# Patient Record
Sex: FEMALE | HISPANIC OR LATINO | Employment: FULL TIME | URBAN - METROPOLITAN AREA
[De-identification: names, ages, dates, MRNs, and addresses within clinical notes are randomized per-mention and may not be internally consistent; named-entity substitution may affect disease eponyms.]

---

## 2017-02-09 ENCOUNTER — APPOINTMENT (OUTPATIENT)
Dept: MEDICAL GROUP | Facility: MEDICAL CENTER | Age: 22
End: 2017-02-09
Payer: COMMERCIAL

## 2017-02-16 ENCOUNTER — PATIENT MESSAGE (OUTPATIENT)
Dept: MEDICAL GROUP | Facility: MEDICAL CENTER | Age: 22
End: 2017-02-16

## 2017-02-16 DIAGNOSIS — Z30.41 FAMILY PLANNING, BCP (BIRTH CONTROL PILLS) MAINTENANCE: ICD-10-CM

## 2017-02-17 ENCOUNTER — PATIENT MESSAGE (OUTPATIENT)
Dept: MEDICAL GROUP | Facility: MEDICAL CENTER | Age: 22
End: 2017-02-17

## 2017-02-17 RX ORDER — NORETHINDRONE ACETATE AND ETHINYL ESTRADIOL .02; 1 MG/1; MG/1
1 TABLET ORAL DAILY
Qty: 28 TAB | Refills: 5 | Status: SHIPPED | OUTPATIENT
Start: 2017-02-17 | End: 2019-01-30

## 2017-02-17 NOTE — TELEPHONE ENCOUNTER
From: Chase Goode  To: KADE Wu  Sent: 2/17/2017 9:26 AM PST  Subject: Non-Urgent Medical Question    ok, Thank you can you tell [facility] to please?  ----- Message -----  From: KADE Wu  Sent: 2/17/2017 8:24 AM PST  To: Chase Goode  Subject: RE: Non-Urgent Medical Question    Chase,  I will send in another 6 months. We should see you in August.  Megan TAYLOR      ----- Message -----   From: CHASE GOODE   Sent: 2/16/2017 10:18 PM PST   To: KADE Wu  Subject: Non-Urgent Medical Question    Hi ladan I had a question on my birth control pills I'm on my last pack do I need to go see you or could you send me the prescription to my pharmacy?  ----- Message -----  From: KADE Wu  Sent: 8/22/2016 2:29 PM PDT  To: Chase Goode  Subject: RE: Non-Urgent Medical Question    Lauryn  I reviewed the note from your visit with Dr. Adrian. With you having been on Depo-Provera for long term it's very unlikely that you ovulated in the short period of time between stopping the injection and starting the oral contraception. You can continue with the oral pill. Let me know if you have any further questions.  Megan TAYLOR            ----- Message -----   From: CHASE GOODE   Sent: 8/22/2016 10:00 AM PDT   To: KADE Wu  Subject: Non-Urgent Medical Question    Hi Ladan I had a question. I started my birth control pills on Friday 8/19/16. I'm a little worried because i had unprotected sex yesterday and i'm not sure what to do if to stop the pills and wait of just keep on taking them?  ----- Message -----  From: Ladan Cotton A.P.R.N.  Sent: 7/18/2016 6:54 AM PDT  To: Chase Goode  Subject: RE: Non-Urgent Medical Question    Chase,  Please make an appointment so we can go over information about oral contraceptives. You should use condoms in the meantime if you have not had your injection.  Megan Cotton  CLAUDIA    ----- Message -----   From: CHASE GOODE   Sent: 7/15/2016 10:09 AM PDT   To: KADE Wu  Subject: Non-Urgent Medical Question    Shaun Ochoa I had a question about my birth control I've been on the depo shot and is due soon but I want to switch to the pill do I need to wait for a bit or can I switch right away?? Also can I still get them with you??

## 2017-02-17 NOTE — TELEPHONE ENCOUNTER
From: Chase Goode  To: KADE Wu  Sent: 2/16/2017 10:18 PM PST  Subject: Non-Urgent Medical Question    Shaun pickering I had a question on my birth control pills I'm on my last pack do I need to go see you or could you send me the prescription to my pharmacy?  ----- Message -----  From: KADE Wu  Sent: 8/22/2016 2:29 PM PDT  To: Chase Goode  Subject: RE: Non-Urgent Medical Question    Lauryn,  I reviewed the note from your visit with Dr. Adrian. With you having been on Depo-Provera for long term it's very unlikely that you ovulated in the short period of time between stopping the injection and starting the oral contraception. You can continue with the oral pill. Let me know if you have any further questions.  Megan TAYLOR            ----- Message -----   From: CHASE GOODE   Sent: 8/22/2016 10:00 AM PDT   To: KADE Wu  Subject: Non-Urgent Medical Question    Shaun Pickering I had a question. I started my birth control pills on Friday 8/19/16. I'm a little worried because i had unprotected sex yesterday and i'm not sure what to do if to stop the pills and wait of just keep on taking them?  ----- Message -----  From: KADE Wu  Sent: 7/18/2016 6:54 AM PDT  To: Chase Goode  Subject: RE: Non-Urgent Medical Question    Chase,  Please make an appointment so we can go over information about oral contraceptives. You should use condoms in the meantime if you have not had your injection.  Megan TAYLOR    ----- Message -----   From: CHASE GOODE   Sent: 7/15/2016 10:09 AM PDT   To: KADE Wu  Subject: Non-Urgent Medical Question    Shaun Pickering I had a question about my birth control I've been on the depo shot and is due soon but I want to switch to the pill do I need to wait for a bit or can I switch right away?? Also can I still get them with you??

## 2017-03-29 ENCOUNTER — TELEPHONE (OUTPATIENT)
Dept: MEDICAL GROUP | Facility: MEDICAL CENTER | Age: 22
End: 2017-03-29

## 2017-03-29 NOTE — TELEPHONE ENCOUNTER
----- Message from KADE Wu sent at 3/29/2017  3:19 PM PDT -----  Regarding: FW: Non-Urgent Medical Question  Contact: [phone]  Do you know if this was done?    ----- Message -----     From: Chase Goode     Sent: 3/28/2017   9:56 AM       To: KADE Wu  Subject: Non-Urgent Medical Question                      Hi Gabriela I was wondering if you could fax over a copy of my shot record please.  Fax Number [phone]  ----- Message -----  From: KADE Wu  Sent: 2/17/2017  9:37 AM PST  To: Chase Goode  Subject: RE: Non-Urgent Medical Question    [facility] [address]  Megan Cotton APRANGELIKA      ----- Message -----     From: CHASE GOODE     Sent: 2/17/2017  9:26 AM PST       To: KADE Wu  Subject: Non-Urgent Medical Question    ok, Thank you can you tell me [facility] what [facility] you will [facility] to please?  ----- Message -----  From: KADE Wu  Sent: 2/17/2017  8:24 AM PST  To: Chase Goode  Subject: RE: Non-Urgent Medical Question    Chase,  I will send in another 6 months. We should see you in August.  Megan Cotton APRANGELIKA      ----- Message -----     From: CHASE GOODE     Sent: 2/16/2017 10:18 PM PST       To: KADE Wu  Subject: Non-Urgent Medical Question    Shaun pickering I had a question on my birth control pills I'm on my last pack do I need to go see you or could you send me the prescription to my pharmacy?  ----- Message -----  From: KADE Wu  Sent: 8/22/2016  2:29 PM PDT  To: Chase Goode  Subject: RE: Non-Urgent Medical Question    Lauryn,  I reviewed the note from your visit with Dr. Adrian. With you having been on Depo-Provera for long term it's very unlikely that you ovulated in the short period of time between stopping the injection and starting the oral contraception. You can continue with the oral pill. Let me know if you have any further questions.  Megan TAYLOR            ----- Message -----      From: CHASE GOODE     Sent: 8/22/2016 10:00 AM PDT       To: KADE Wu  Subject: Non-Urgent Medical Question    Shaun Ochoa I had a question. I started my birth control pills on Friday 8/19/16. I'm a little worried because i had unprotected sex yesterday and i'm not sure what to do if to stop the pills and wait of just keep on taking them?  ----- Message -----  From: KADE Wu  Sent: 7/18/2016  6:54 AM PDT  To: Chase Goode  Subject: RE: Non-Urgent Medical Question    Chase,  Please make an appointment so we can go over information about oral contraceptives. You should use condoms in the meantime if you have not had your injection.  Megan TAYLOR    ----- Message -----     From: CHASE GOODE     Sent: 7/15/2016 10:09 AM PDT       To: KADE Wu  Subject: Non-Urgent Medical Question    Shaun Connellia I had a question about my birth control I've been on the depo shot and is due soon but I want to switch to the pill do I need to wait for a bit or can I switch right away?? Also can I still get them with you??

## 2017-04-28 ENCOUNTER — OFFICE VISIT (OUTPATIENT)
Dept: MEDICAL GROUP | Facility: MEDICAL CENTER | Age: 22
End: 2017-04-28
Payer: COMMERCIAL

## 2017-04-28 VITALS
WEIGHT: 169 LBS | SYSTOLIC BLOOD PRESSURE: 110 MMHG | HEIGHT: 62 IN | DIASTOLIC BLOOD PRESSURE: 68 MMHG | BODY MASS INDEX: 31.1 KG/M2 | TEMPERATURE: 97.6 F | OXYGEN SATURATION: 99 % | HEART RATE: 64 BPM

## 2017-04-28 DIAGNOSIS — E66.9 OBESITY (BMI 30-39.9): ICD-10-CM

## 2017-04-28 DIAGNOSIS — R63.5 WEIGHT GAIN: ICD-10-CM

## 2017-04-28 DIAGNOSIS — Z00.00 ANNUAL PHYSICAL EXAM: ICD-10-CM

## 2017-04-28 DIAGNOSIS — Z23 NEED FOR VACCINATION: ICD-10-CM

## 2017-04-28 PROCEDURE — 99395 PREV VISIT EST AGE 18-39: CPT | Mod: 25 | Performed by: NURSE PRACTITIONER

## 2017-04-28 PROCEDURE — 90471 IMMUNIZATION ADMIN: CPT | Performed by: NURSE PRACTITIONER

## 2017-04-28 PROCEDURE — 90734 MENACWYD/MENACWYCRM VACC IM: CPT | Performed by: NURSE PRACTITIONER

## 2017-04-28 ASSESSMENT — PATIENT HEALTH QUESTIONNAIRE - PHQ9: CLINICAL INTERPRETATION OF PHQ2 SCORE: 0

## 2017-04-28 NOTE — MR AVS SNAPSHOT
"Katty Howell   2017 2:40 PM   Office Visit   MRN: [id number]    Department:  [facility]   Dept Phone:  [phone]    Description:  Female : 1995   Provider:  KADE Wu           Reason for Visit     Annual Exam           Allergies as of 2017     No Known Allergies      You were diagnosed with     Annual physical exam   [458515]       Weight gain   [937499]       Obesity (BMI 30-39.9)   [892293]       Need for vaccination   [456553]         Vital Signs     Blood Pressure Pulse Temperature Height Weight Body Mass Index    110/68 mmHg 64 36.4 °C (97.6 °F) 1.575 m (5' 2.01\") 76.658 kg (169 lb) 30.90 kg/m2    Oxygen Saturation Last Menstrual Period Smoking Status             99% 2011 Never Smoker          Basic Information     Date Of Birth Sex Race Ethnicity Preferred Language    1995 Female  or   Origin (Guatemalan,Cymro,Syrian,Anthony, etc) English      Problem List              ICD-10-CM Priority Class Noted - Resolved    Weight gain R63.5   2017 - Present    Obesity (BMI 30-39.9) E66.9   2017 - Present      Health Maintenance        Date Due Completion Dates    IMM MENINGOCOCCAL VACCINE (MCV4) (2 of 2) 2011 10/15/2007    PAP SMEAR 2016    IMM DTaP/Tdap/Td Vaccine (8 - Td) 2026, 2016, 10/15/2007, 1999, 1998, 1995, 1995, 1995            Current Immunizations     DTP/HIB Combined Vaccine 1995, 1995, 1995    Dtap Vaccine 1999, 1998    HPV Quadrivalent Vaccine (GARDASIL) 2008, 2008, 10/15/2007    Hepatitis A Vaccine, Ped/Adol 2006, 2002    Hepatitis B Immune Globulin 2015    Hepatitis B Vaccine Non-Recombivax (Ped/Adol) 1995, 1995, 1995    Hepatitis B Vaccine Recombivax (Adol/Adult) 2016, 2015, 2015    Hib Vaccine (Prp-d) Historical Data 1998    MMR Vaccine 1999, " 7/30/1998    Meningococcal Conjugate Vaccine MCV4 (Menactra) 4/28/2017, 10/15/2007    OPV - Historical Data 5/26/1999, 1995, 1995, 1995    Tdap Vaccine 6/13/2016, 4/29/2016, 10/15/2007    Varicella Vaccine Live 10/15/2007, 8/17/2006, 5/26/1999      Below and/or attached are the medications your provider expects you to take. Review all of your home medications and newly ordered medications with your provider and/or pharmacist. Follow medication instructions as directed by your provider and/or pharmacist. Please keep your medication list with you and share with your provider. Update the information when medications are discontinued, doses are changed, or new medications (including over-the-counter products) are added; and carry medication information at all times in the event of emergency situations     Allergies:  No Known Allergies          Medications  Valid as of: April 28, 2017 -  4:10 PM    Generic Name Brand Name Tablet Size Instructions for use    Calcium Carbonate-Vit D-Min   Take  by mouth.        Norethindrone Acet-Ethinyl Est (Tab) MICROGESTIN 1/20 1-20 MG-MCG Take 1 Tab by mouth every day. With iron        .                 Medicines prescribed today were sent to:     [facility] [address] [place] [address] (), [id number] () [place] [address]    Phone: [phone] Fax: [phone]    Open 24 Hours?: No      Medication refill instructions:       If your prescription bottle indicates you have medication refills left, it is not necessary to call your provider’s office. Please contact your pharmacy and they will refill your medication.    If your prescription bottle indicates you do not have any refills left, you may request refills at any time through one of the following ways: The online [employer]aved system (except Urgent Care), by calling your provider’s office, or by asking your pharmacy to contact your provider’s office with a refill request. Medication refills  are processed only during regular business hours and may not be available until the next business day. Your provider may request additional information or to have a follow-up visit with you prior to refilling your medication.   *Please Note: Medication refills are assigned a new Rx number when refilled electronically. Your pharmacy may indicate that no refills were authorized even though a new prescription for the same medication is available at the pharmacy. Please request the medicine by name with the pharmacy before contacting your provider for a refill.        Your To Do List     Future Labs/Procedures Complete By Expires    COMP METABOLIC PANEL  As directed 4/29/2018    LIPID PROFILE  As directed 4/29/2018    TSH WITH REFLEX TO FT4  As directed 4/28/2018         MyChart Access Code: Activation code not generated  Current [place] Status: Active

## 2017-04-28 NOTE — PROGRESS NOTES
Chief Complaint   Patient presents with   • Annual Exam     Katty Howell is a 21 y.o. female established patient here for annual exam. She is generally feeling well. She continues with Loestrin Fe for birth control, is concerned that this may be contributing to her weight gain as discussed below. No significant dysmenorrhea or menorrhagia. In the past she had done well with Depo-Provera but she does state that she may plan for pregnancy next year.  Weight gain, BMI 30.9   17 lbs since last visit in April 2016  Exercising 4-5 days a week, mostly cardio with weights  Low carb diet, no sugary beverages, high vegetable  Sometimes feeling tired, constipated  No hair loss, heat or cold intolerance  She feels that this may be related to birth control  pap completed in 2016 and normal     Due for 2nd meningococcal vaccine    Current medicines (including changes today)  Current Outpatient Prescriptions   Medication Sig Dispense Refill   • norethindrone-ethinyl estradiol (LOESTRIN 1/20, 21,) 1-20 MG-MCG per tablet Take 1 Tab by mouth every day. With iron 28 Tab 5   • Calcium Carbonate-Vit D-Min (CALCIUM 1200 PO) Take  by mouth.       No current facility-administered medications for this visit.     She  has no past medical history of Diabetes, Hypertension, Arthritis, Kidney disease, Seizure (CMS-HCC), Anxiety, Depression, ASTHMA, Blood transfusion, Cancer (CMS-[facility]), Tuberculosis, or Thyroid disease.  She  has past surgical history that includes bunionectomy (7/10/2009).  Social History   Substance Use Topics   • Smoking status: Never Smoker    • Smokeless tobacco: Never Used   • Alcohol Use: No     Social History     Social History Narrative     No family history on file.  Family Status   Relation Status Death Age   • Mother Alive    • Sister     • Brother     • Maternal Grandmother Alive    • Maternal Grandfather Alive    • Father Alive    • Daughter Alive          ROS  Problems listed discussed above, all other systems  "reviewed and negative     Objective:     Blood pressure 110/68, pulse 64, temperature 36.4 °C (97.6 °F), height 1.575 m (5' 2.01\"), weight 76.658 kg (169 lb), last menstrual period 05/09/2011, SpO2 99 %. Body mass index is 30.9 kg/(m^2).  Physical Exam:  General: Alert, oriented in no acute distress.  Eye contact is good, speech is normal, affect calm  HEENT: perrl, Oral mucosa pink moist, no lesions. Nares patent. TMs gray with good landmarks bilaterally. No cervical or supraclavicular lymphadenopathy  Lungs: clear to auscultation bilaterally, good aeration, normal effort. No wheeze/ rhonchi/ rales.  CV: regular rate and rhythm, S1, S2. No murmur, no JVD, no edema. Pedal pulses 2 + bilaterally  Abdomen: soft, nontender, BS x4, No CVAT.  Ext: color normal, vascularity normal, temperature normal. No rash or lesions.  MS: no point tenderness over spine, no obvious deformity. No joint swelling or redness. Strength is 5/5 globally  Neuro: DTR 2+ bilaterally  Assessment and Plan:   The following treatment plan was discussed   1. Annual physical exam   normal physical exam today except as discussed below. Health promotion up-to-date including healthy diet, regular exercise. 2000 international units vitamin D3 daily. Labs as listed below. Up-to-date on Pap smear  COMP METABOLIC PANEL    LIPID PROFILE    TSH WITH REFLEX TO FT4   2. Weight gain   additions of her weight gain could be related to her current OCP. 17 pounds would be more than expected. Labs as listed above, exercise recommendations reviewed  Patient identified as having weight management issue.  Appropriate orders and counseling given.   3. Obesity (BMI 30-39.9)  Patient identified as having weight management issue.  Appropriate orders and counseling given.   4. Need for vaccination  I have placed the below orders and discussed them with an approved delegating provider. The MA is performing the below orders under the direction of Dr. Corimer  MENINGOCOCCAL " CONJUGATE VACCINE 4-VALENT IM       Educated in proper administration of medication(s) ordered today including safety, possible SE, risks, benefits, rationale and alternatives to therapy.         Followup: Annually and pending labs             Please note that this dictation was created using voice recognition software. I have worked with consultants from the vendor as well as technical experts from [facility] to optimize the interface. I have made every reasonable attempt to correct obvious errors, but I expect that there are errors of grammar and possibly content that I did not discover before finalizing the note.

## 2017-05-26 ENCOUNTER — OFFICE VISIT (OUTPATIENT)
Dept: MEDICAL GROUP | Facility: MEDICAL CENTER | Age: 22
End: 2017-05-26
Payer: COMMERCIAL

## 2017-05-26 VITALS
OXYGEN SATURATION: 100 % | WEIGHT: 168 LBS | BODY MASS INDEX: 30.91 KG/M2 | TEMPERATURE: 98.2 F | DIASTOLIC BLOOD PRESSURE: 72 MMHG | HEART RATE: 64 BPM | HEIGHT: 62 IN | SYSTOLIC BLOOD PRESSURE: 102 MMHG

## 2017-05-26 DIAGNOSIS — Z78.9 USES BIRTH CONTROL: ICD-10-CM

## 2017-05-26 DIAGNOSIS — R63.5 WEIGHT GAIN: ICD-10-CM

## 2017-05-26 PROCEDURE — 99213 OFFICE O/P EST LOW 20 MIN: CPT | Performed by: NURSE PRACTITIONER

## 2017-05-26 RX ORDER — NORELGESTROMIN AND ETHINYL ESTRADIOL 35; 150 UG/MG; UG/MG
PATCH TRANSDERMAL
Qty: 3 PATCH | Refills: 5 | Status: SHIPPED | OUTPATIENT
Start: 2017-05-26 | End: 2019-01-30

## 2017-05-26 NOTE — PROGRESS NOTES
"Subjective:     Chief Complaint   Patient presents with   • Contraception     Discuss new form of BC      Katty Howell is a 22 y.o. female here today to follow up on:    Uses birth control  Currently on Loestrin 1-20  Has gained weight since starting this medication and would like to switch to an alternative, specifically requesting a patch  Has used Depo-Provera in the past which worked well although she has concerns about decreased fertility and does not want to continue with this     Weight gain  Attributes her weight gain to OCP. Recent thyroid studies normal. She is exercising 5-6 days weekly, following a healthy diet    Current medicines (including changes today)  Current Outpatient Prescriptions   Medication Sig Dispense Refill   • norelgestromin-ethinyl estradiol (ORTHO EVRA) 150-35 MCG/24HR patch 1 patch to the skin every 7 days x 3 then off 1 week 3 Patch 5   • norethindrone-ethinyl estradiol (LOESTRIN 1/20, 21,) 1-20 MG-MCG per tablet Take 1 Tab by mouth every day. With iron 28 Tab 5   • Calcium Carbonate-Vit D-Min (CALCIUM 1200 PO) Take  by mouth.       No current facility-administered medications for this visit.     She  has no past medical history of Diabetes, Hypertension, Arthritis, Kidney disease, Seizure (CMS-HCC), Anxiety, Depression, ASTHMA, Blood transfusion, Cancer (CMS-[facility]), Tuberculosis, or Thyroid disease.    ROS included above     Objective:     Blood pressure 102/72, pulse 64, temperature 36.8 °C (98.2 °F), height 1.575 m (5' 2\"), weight 76.204 kg (168 lb), last menstrual period 05/09/2011, SpO2 100 %. Body mass index is 30.72 kg/(m^2).     Physical Exam:  General: Alert, oriented in no acute distress.  Eye contact is good, speech is normal, affect calm  Lungs: clear to auscultation bilaterally, normal effort, no wheeze/ rhonchi/ rales.  CV: regular rate and rhythm, S1, S2, no murmur  Abdomen: soft, nontender  Ext: no edema, color normal, vascularity normal, temperature " normal    Assessment and Plan:   The following treatment plan was discussed  1. Uses birth control   requesting change of birth control, feels that her oral contraceptive has caused weight gain. She is on a lower estrogen and lower progesterone potency currently. I'm doubtful this would change the patch will impact her weight and have discussed this with her. She would like to proceed, will change to:  norelgestromin-ethinyl estradiol (ORTHO EVRA) 150-35 MCG/24HR patch  Risks of hormonal contraception again reviewed    2. Weight gain   encouraged to continue with healthy diet and regular exercise        Followup: as needed         Please note that this dictation was created using voice recognition software. I have worked with consultants from the vendor as well as technical experts from [facility] [employer] to optimize the interface. I have made every reasonable attempt to correct obvious errors, but I expect that there are errors of grammar and possibly content that I did not discover before finalizing the note.

## 2017-05-26 NOTE — MR AVS SNAPSHOT
"Katty Howell   2017 2:00 PM   Office Visit   MRN: [id number]    Department:  [facility]   Dept Phone:  [phone]    Description:  Female : 1995   Provider:  KADE Wu           Reason for Visit     Contraception Discuss new form of BC       Allergies as of 2017     No Known Allergies      You were diagnosed with     Uses birth control   [672173]       Weight gain   [785920]         Vital Signs     Blood Pressure Pulse Temperature Height Weight Body Mass Index    102/72 mmHg 64 36.8 °C (98.2 °F) 1.575 m (5' 2\") 76.204 kg (168 lb) 30.72 kg/m2    Oxygen Saturation Last Menstrual Period Smoking Status             100% 2011 Never Smoker          Basic Information     Date Of Birth Sex Race Ethnicity Preferred Language    1995 Female  or   Origin (Malian,Icelandic,Japanese,German, etc) English      Problem List              ICD-10-CM Priority Class Noted - Resolved    Weight gain R63.5   2017 - Present    Obesity (BMI 30-39.9) E66.9   2017 - Present    Uses birth control Z30.9   2017 - Present      Health Maintenance        Date Due Completion Dates    PAP SMEAR 2019 (Done), 2011    Override on 2016: Done    IMM DTaP/Tdap/Td Vaccine (8 - Td) 2026, 2016, 10/15/2007, 1999, 1998, 1995, 1995, 1995            Current Immunizations     DTP/HIB Combined Vaccine 1995, 1995, 1995    Dtap Vaccine 1999, 1998    HPV Quadrivalent Vaccine (GARDASIL) 2008, 2008, 10/15/2007    Hepatitis A Vaccine, Ped/Adol 2006, 2002    Hepatitis B Immune Globulin 2015    Hepatitis B Vaccine Non-Recombivax (Ped/Adol) 1995, 1995, 1995    Hepatitis B Vaccine Recombivax (Adol/Adult) 2016, 2015, 2015    Hib Vaccine (Prp-d) Historical Data 1998    MMR Vaccine 1999, 1998    Meningococcal " Conjugate Vaccine MCV4 (Menactra) 4/28/2017, 10/15/2007    OPV - Historical Data 5/26/1999, 1995, 1995, 1995    Tdap Vaccine 6/13/2016, 4/29/2016, 10/15/2007    Varicella Vaccine Live 10/15/2007, 8/17/2006, 5/26/1999      Below and/or attached are the medications your provider expects you to take. Review all of your home medications and newly ordered medications with your provider and/or pharmacist. Follow medication instructions as directed by your provider and/or pharmacist. Please keep your medication list with you and share with your provider. Update the information when medications are discontinued, doses are changed, or new medications (including over-the-counter products) are added; and carry medication information at all times in the event of emergency situations     Allergies:  No Known Allergies          Medications  Valid as of: May 26, 2017 -  3:09 PM    Generic Name Brand Name Tablet Size Instructions for use    Calcium Carbonate-Vit D-Min   Take  by mouth.        Norelgestromin-Eth Estradiol (PATCH WEEKLY) ORTHO EVRA 150-35 MCG/24HR 1 patch to the skin every 7 days x 3 then off 1 week        Norethindrone Acet-Ethinyl Est (Tab) MICROGESTIN 1/20 1-20 MG-MCG Take 1 Tab by mouth every day. With iron        .                 Medicines prescribed today were sent to:     [facility] [address] [place] [address]), [place] - [address]    [address] () [place] [address]    Phone: [phone] Fax: [phone]    Open 24 Hours?: No      Medication refill instructions:       If your prescription bottle indicates you have medication refills left, it is not necessary to call your provider’s office. Please contact your pharmacy and they will refill your medication.    If your prescription bottle indicates you do not have any refills left, you may request refills at any time through one of the following ways: The online Scodix system (except Urgent Care), by calling your provider’s office, or  by asking your pharmacy to contact your provider’s office with a refill request. Medication refills are processed only during regular business hours and may not be available until the next business day. Your provider may request additional information or to have a follow-up visit with you prior to refilling your medication.   *Please Note: Medication refills are assigned a new Rx number when refilled electronically. Your pharmacy may indicate that no refills were authorized even though a new prescription for the same medication is available at the pharmacy. Please request the medicine by name with the pharmacy before contacting your provider for a refill.           HeadCount Access Code: [email or web address]  Expires: 6/25/2017 12:44 PM    HeadCount  A secure, online tool to manage your health information     [employer]’s HeadCount® is a secure, online tool that connects you to your personalized health information from the privacy of your home -- day or night - making it very easy for you to manage your healthcare. Once the activation process is completed, you can even access your medical information using the HeadCount cherri, which is available for free in the Apple Cherri store or Google Play store.     HeadCount provides the following levels of access (as shown below):   My Chart Features   [facility] Doctor [facility]  Urgent  Care Non-[facility]  Primary Care  Doctor   Email your healthcare team securely and privately 24/7 X X X    Manage appointments: schedule your next appointment; view details of past/upcoming appointments X      Request prescription refills. X      View recent personal medical records, including lab and immunizations X X X X   View health record, including health history, allergies, medications X X X X   Read reports about your outpatient visits, procedures, consult and ER notes X X X X   See your discharge summary, which is a recap of your hospital and/or ER visit that includes your  diagnosis, lab results, and care plan. X X       How to register for Kojami:  1. Go to  [email or web address].  2. Click on the Sign Up Now box, which takes you to the New Member Sign Up page. You will need to provide the following information:  a. Enter your Kojami Access Code exactly as it appears at the top of this page. (You will not need to use this code after you’ve completed the sign-up process. If you do not sign up before the expiration date, you must request a new code.)   b. Enter your date of birth.   c. Enter your home email address.   d. Click Submit, and follow the next screen’s instructions.  3. Create a 3C Plust ID. This will be your Kojami login ID and cannot be changed, so think of one that is secure and easy to remember.  4. Create a [employer] Plust password. You can change your password at any time.  5. Enter your Password Reset Question and Answer. This can be used at a later time if you forget your password.   6. Enter your e-mail address. This allows you to receive e-mail notifications when new information is available in Kojami.  7. Click Sign Up. You can now view your health information.    For assistance activating your Kojami account, call [phone]

## 2018-09-17 ENCOUNTER — OFFICE VISIT (OUTPATIENT)
Dept: MEDICAL GROUP | Facility: MEDICAL CENTER | Age: 23
End: 2018-09-17
Payer: COMMERCIAL

## 2018-09-17 VITALS
SYSTOLIC BLOOD PRESSURE: 116 MMHG | HEART RATE: 69 BPM | OXYGEN SATURATION: 98 % | WEIGHT: 172 LBS | HEIGHT: 62 IN | RESPIRATION RATE: 16 BRPM | BODY MASS INDEX: 31.65 KG/M2 | TEMPERATURE: 98.6 F | DIASTOLIC BLOOD PRESSURE: 60 MMHG

## 2018-09-17 DIAGNOSIS — E66.9 OBESITY (BMI 30-39.9): ICD-10-CM

## 2018-09-17 DIAGNOSIS — Z30.42 ON DEPO-PROVERA FOR CONTRACEPTION: ICD-10-CM

## 2018-09-17 DIAGNOSIS — Z23 NEED FOR INFLUENZA VACCINATION: ICD-10-CM

## 2018-09-17 LAB
INT CON NEG: NEGATIVE
INT CON POS: POSITIVE
POC URINE PREGNANCY TEST: NEGATIVE

## 2018-09-17 PROCEDURE — 99214 OFFICE O/P EST MOD 30 MIN: CPT | Mod: 25 | Performed by: NURSE PRACTITIONER

## 2018-09-17 PROCEDURE — 90471 IMMUNIZATION ADMIN: CPT | Performed by: NURSE PRACTITIONER

## 2018-09-17 PROCEDURE — 81025 URINE PREGNANCY TEST: CPT | Performed by: NURSE PRACTITIONER

## 2018-09-17 PROCEDURE — 90686 IIV4 VACC NO PRSV 0.5 ML IM: CPT | Performed by: NURSE PRACTITIONER

## 2018-09-17 ASSESSMENT — PATIENT HEALTH QUESTIONNAIRE - PHQ9: CLINICAL INTERPRETATION OF PHQ2 SCORE: 0

## 2018-09-17 NOTE — ASSESSMENT & PLAN NOTE
Frustrated that she is unable to lose weight, questions if it may be r/t depo  Following healthy diet- lots of vegetables, fruit, gibbons. Exercising with , has not lost any weight  Prior thyroid testing normal. No constipation, fatigue, hair loss

## 2018-09-18 RX ORDER — MEDROXYPROGESTERONE ACETATE 150 MG/ML
150 INJECTION, SUSPENSION INTRAMUSCULAR ONCE
OUTPATIENT
Start: 2018-09-18 | End: 2018-09-19

## 2018-09-18 NOTE — ASSESSMENT & PLAN NOTE
Due for Depo provera injection today, last dose in June  She has been on depo total of more than 5 years. Was on OCP for short periods of time when last seen but then resumed injection through another clinic.  Risks associated with long term use reviewed. Pt would like 3 months to consider other family planning options

## 2018-09-18 NOTE — PROGRESS NOTES
"Subjective:     Chief Complaint   Patient presents with   • Contraception     DEPO   • Labs Only     TALK ABOUT RESULTS//BODY CHANGES     Katty Howell is a 23 y.o. female here today to follow up on:    Obesity (BMI 30-39.9)  Frustrated that she is unable to lose weight, questions if it may be r/t depo  Following healthy diet- lots of vegetables, fruit, gibbons. Exercising with , has not lost any weight  Prior thyroid testing normal. No constipation, fatigue, hair loss    On Depo-Provera for contraception  Due for Depo provera injection today, last dose in June  She has been on depo total of more than 5 years. Was on OCP for short periods of time when last seen but then resumed injection through another clinic.  Risks associated with long term use reviewed. Pt would like 3 months to consider other family planning options       Current medicines (including changes today)  Current Outpatient Prescriptions   Medication Sig Dispense Refill   • norelgestromin-ethinyl estradiol (ORTHO EVRA) 150-35 MCG/24HR patch 1 patch to the skin every 7 days x 3 then off 1 week 3 Patch 5   • norethindrone-ethinyl estradiol (LOESTRIN 1/20, 21,) 1-20 MG-MCG per tablet Take 1 Tab by mouth every day. With iron 28 Tab 5   • Calcium Carbonate-Vit D-Min (CALCIUM 1200 PO) Take  by mouth.       No current facility-administered medications for this visit.      She  has no past medical history of Anxiety; Arthritis; ASTHMA; Blood transfusion; Cancer (HCC); Depression; Diabetes; Hypertension; Kidney disease; Seizure (HCC); Thyroid disease; or Tuberculosis.    ROS included above     Objective:     Blood pressure 116/60, pulse 69, temperature 37 °C (98.6 °F), resp. rate 16, height 1.575 m (5' 2\"), weight 78 kg (172 lb), SpO2 98 %. Body mass index is 31.46 kg/m².     Physical Exam:  General: Alert, oriented in no acute distress.  Eye contact is good, speech is normal, affect calm  Lungs: clear to auscultation bilaterally, normal " effort, no wheeze/ rhonchi/ rales.  CV: regular rate and rhythm, S1, S2, no murmur  Abdomen: soft, nontender  Ext: no edema, color normal, vascularity normal, temperature normal    Assessment and Plan:   The following treatment plan was discussed   1. On Depo-Provera for contraception  On Depo-provera about 5 years at this point. Long term risks and alternative birth control options discussed. Pt would like 3 months to consider options, will give dose in the office today. Plan for contraceptive change in Dec. Next pap due 2019  POCT Pregnancy   2. Obesity (BMI 30-39.9)  She is interested in referral for weight loss. Diet and exercise recommendations reviewed  REFERRAL TO [facility] IMPROVEMENT PROGRAMS (HIP) Services Requested: Physician Medical Weight Management Program, Registered Dietitian for Medical Nutrition Therapy; Reason for Referral? BMI>30; Reason for Visit: Overweight/Obesity   3. Need for influenza vaccination  I have placed the below orders and discussed them with an approved delegating provider. The MA is performing the below orders under the direction of Dr. Cormier  Flu Quad Inj >3 Year Pre-Filled PF       Followup: annually         Please note that this dictation was created using voice recognition software. I have worked with consultants from the vendor as well as technical experts from [facility] to optimize the interface. I have made every reasonable attempt to correct obvious errors, but I expect that there are errors of grammar and possibly content that I did not discover before finalizing the note.

## 2018-12-17 ENCOUNTER — PATIENT MESSAGE (OUTPATIENT)
Dept: MEDICAL GROUP | Facility: MEDICAL CENTER | Age: 23
End: 2018-12-17

## 2018-12-17 DIAGNOSIS — Z30.02 ENCOUNTER FOR COUNSELING AND INSTRUCTION IN NATURAL FAMILY PLANNING TO AVOID PREGNANCY: ICD-10-CM

## 2018-12-18 NOTE — PATIENT COMMUNICATION
EMERALDI, I can put those in and take them out.  ...taking them out can be a problem if the person that put them in did it too deeply, though.      In any case, if your patient wants me to do it, let me know and I'll review the case.

## 2019-01-30 ENCOUNTER — OFFICE VISIT (OUTPATIENT)
Dept: MEDICAL GROUP | Facility: MEDICAL CENTER | Age: 24
End: 2019-01-30
Payer: COMMERCIAL

## 2019-01-30 VITALS
BODY MASS INDEX: 31.62 KG/M2 | SYSTOLIC BLOOD PRESSURE: 106 MMHG | TEMPERATURE: 98.8 F | OXYGEN SATURATION: 97 % | HEART RATE: 53 BPM | DIASTOLIC BLOOD PRESSURE: 60 MMHG | HEIGHT: 62 IN | WEIGHT: 171.8 LBS

## 2019-01-30 DIAGNOSIS — E66.9 OBESITY (BMI 30-39.9): ICD-10-CM

## 2019-01-30 DIAGNOSIS — Z30.017 ENCOUNTER FOR INITIAL PRESCRIPTION OF NEXPLANON: ICD-10-CM

## 2019-01-30 DIAGNOSIS — Z78.9 USES CONDOMS: ICD-10-CM

## 2019-01-30 PROCEDURE — 99214 OFFICE O/P EST MOD 30 MIN: CPT | Performed by: FAMILY MEDICINE

## 2019-01-30 ASSESSMENT — PATIENT HEALTH QUESTIONNAIRE - PHQ9: CLINICAL INTERPRETATION OF PHQ2 SCORE: 0

## 2019-01-31 ENCOUNTER — TELEPHONE (OUTPATIENT)
Dept: MEDICAL GROUP | Facility: MEDICAL CENTER | Age: 24
End: 2019-01-31

## 2019-02-04 NOTE — PROGRESS NOTES
Subjective:   Chief Complaint/History of Present Illness:  Katty Howell is a 23 y.o. female established patient who presents today to discuss medical problems as listed below    Diagnoses of Encounter for initial prescription of Nexplanon, Uses condoms, and Obesity (BMI 30-39.9) were pertinent to this visit.    Encounter for initial prescription of Nexplanon  This is a new problem for my medical evaluation, uncontrolled, patient presents today for consultation regarding Nexplanon placement.  We discussed that since patient was on Depo-Provera in the past, and is no longer on any birth control medication at present time, patient needs to be using condoms on a regular basis for contraception.  Furthermore, we discussed the procedure on how to place the Nexplanon implant, as well as the lower overall dosage of medications.  Therefore, patient may experience some symptoms of menstrual irregularities, eventually her periods should regulate to lower overall amount of menstrual flow compared to being off of medication    Patient's LMP was patient did not have periods while on Provera, has not had a period of the last 1.5-2 months since she has been off of Depo-Provera.    Patient without any history of clotting disorders or DVT/PE.  Patient without any family history of the same conditions    ROS is NEGATIVE for fevers, chills, generalized weakness/fatigue, dizziness, vertigo, lightheadedness, tachypnea, dyspnea, pain on deep inspiration, tachycardia, palpitations, bilateral lower extremity pain/paresthesias/pallor/poikilothermia/weakness/paralysis.    Uses condoms  New problem, uncontrolled, please see notes from same date of service 01/30/19 regarding Nexplanon.    Obesity (BMI 30-39.9)  Chronic, uncontrolled, patient advised to pursue lifestyle changes, particularly cardiovascular exercise and increasing proportion of plant-based nutrition.      Patient Active Problem List    Diagnosis Date Noted   • Uses condoms  "05/26/2017   • Weight gain 04/28/2017   • Obesity (BMI 30-39.9) 04/28/2017   • Encounter for initial prescription of Nexplanon 04/01/2016       Additional History:   Allergies:    Patient has no known allergies.     Current Medications:     No current outpatient prescriptions on file.     No current facility-administered medications for this visit.         Social History:     Social History   Substance Use Topics   • Smoking status: Never Smoker   • Smokeless tobacco: Never Used   • Alcohol use No       ROS:     - NOTE: All other systems reviewed and are negative, except as in HPI.     Objective:   Physical Exam:    Vitals: Blood pressure 106/60, pulse (!) 53, temperature 37.1 °C (98.8 °F), height 1.575 m (5' 2.01\"), weight 77.9 kg (171 lb 12.8 oz), SpO2 97 %, not currently breastfeeding.   BMI: Body mass index is 31.41 kg/m².   General/Constitutional: Vitals as above, Well nourished, well developed female in no acute distress   Head/Eyes: Head is grossly normal & atraumatic, bilateral conjunctivae clear and not injected, bilateral EOMI, bilateral PERRL   ENT: Bilateral external ears grossly normal in appearance, Hearing grossly intact, External nares normal in appearance and without discharge/bleeding   Respiratory: No respiratory distress, bilateral lungs are clear to ausculation in all lung fields (anterior/lateral/posterior), no wheezing/rhonchi/rales   Cardiovascular: Regular rate and rhythm without murmur/gallops/rubs, distal pulses are intact and equal bilaterally (radial, posterior tibial), no bilateral lower extremity edema   MSK: Gait grossly normal & not antalgic   Integumentary: No apparent rashes   Psych: Judgment grossly appropriate, no apparent depression/anxiety    Health Maintenance:     - As patient has not had a period over the last 2months since stopping Depo-Provera, and has not had unprotected sex, we will forgo checking a urine pregnancy test today    Imaging/Labs:     - No new labs to " review.    Assessment and Plan:   1. Encounter for initial prescription of Nexplanon  New problem, uncontrolled, will proceed with paperwork for Nexplanon, have patient perform pregnancy test at next visit.   - etonogestrel (NEXPLANON) 68 MG Implant implant; Inject 1 Each as instructed Once for 1 dose.  Dispense: 1 Each; Refill: 0    2. Uses condoms  New problem, patient to continue using condoms on a regular basis, and can take home pregnancy test if she feels that she may have gotten pregnant.  Patient aware that we will have to hold off on Nexplanon placement should she become pregnant.    3. Obesity (BMI 30-39.9)  Chronic, uncontrolled, patient advised to pursue lifestyle changes, particularly cardiovascular exercise and increasing proportion of plant-based nutrition.        RTC: in 1-2months, depending on how long it takes for prior auth + mailing of Nexplanon.    PLEASE NOTE: This dictation was created using voice recognition software. I have made every reasonable attempt to correct obvious errors, but I expect that there are errors of grammar and possibly content that I did not discover before finalizing the note.

## 2019-02-04 NOTE — ASSESSMENT & PLAN NOTE
Chronic, uncontrolled, patient advised to pursue lifestyle changes, particularly cardiovascular exercise and increasing proportion of plant-based nutrition.

## 2019-02-04 NOTE — ASSESSMENT & PLAN NOTE
This is a new problem for my medical evaluation, uncontrolled, patient presents today for consultation regarding Nexplanon placement.  We discussed that since patient was on Depo-Provera in the past, and is no longer on any birth control medication at present time, patient needs to be using condoms on a regular basis for contraception.  Furthermore, we discussed the procedure on how to place the Nexplanon implant, as well as the lower overall dosage of medications.  Therefore, patient may experience some symptoms of menstrual irregularities, eventually her periods should regulate to lower overall amount of menstrual flow compared to being off of medication    Patient's LMP was patient did not have periods while on Provera, has not had a period of the last 1.5-2 months since she has been off of Depo-Provera.    Patient without any history of clotting disorders or DVT/PE.  Patient without any family history of the same conditions    ROS is NEGATIVE for fevers, chills, generalized weakness/fatigue, dizziness, vertigo, lightheadedness, tachypnea, dyspnea, pain on deep inspiration, tachycardia, palpitations, bilateral lower extremity pain/paresthesias/pallor/poikilothermia/weakness/paralysis.

## 2019-02-04 NOTE — ASSESSMENT & PLAN NOTE
New problem, uncontrolled, please see notes from same date of service 01/30/19 regarding Nexplanon.

## 2019-04-26 ENCOUNTER — TELEPHONE (OUTPATIENT)
Dept: MEDICAL GROUP | Facility: MEDICAL CENTER | Age: 24
End: 2019-04-26

## 2019-04-26 ENCOUNTER — OFFICE VISIT (OUTPATIENT)
Dept: MEDICAL GROUP | Facility: MEDICAL CENTER | Age: 24
End: 2019-04-26
Payer: COMMERCIAL

## 2019-04-26 VITALS
DIASTOLIC BLOOD PRESSURE: 80 MMHG | HEART RATE: 59 BPM | OXYGEN SATURATION: 97 % | TEMPERATURE: 97.9 F | WEIGHT: 166 LBS | RESPIRATION RATE: 16 BRPM | HEIGHT: 62 IN | SYSTOLIC BLOOD PRESSURE: 110 MMHG | BODY MASS INDEX: 30.55 KG/M2

## 2019-04-26 DIAGNOSIS — Z78.9 USES BIRTH CONTROL: ICD-10-CM

## 2019-04-26 PROCEDURE — 99213 OFFICE O/P EST LOW 20 MIN: CPT | Performed by: NURSE PRACTITIONER

## 2019-04-26 RX ORDER — NORELGESTROMIN AND ETHINYL ESTRADIOL 35; 150 UG/MG; UG/MG
PATCH TRANSDERMAL
Qty: 3 PATCH | Refills: 5 | Status: SHIPPED | OUTPATIENT
Start: 2019-04-26 | End: 2021-02-12

## 2019-04-26 NOTE — PROGRESS NOTES
"Subjective:     Chief Complaint   Patient presents with   • Contraception     PATCH      Katty Howell is a 23 y.o. female established patient here requesting birth control.  She has been working to obtain a Nexplanon device, apparently having some difficulty with this as she never heard from the pharmacy.  There is, however, approval from her insurance and documentation that prescription had been sent in her chart.  At this point she would like something temporary until she gets this worked out and is able to obtain the device.  We had discussed options in the past, she prefers to avoid oral medication.  She would like a prescription for the patch.  Last menstrual period 2 weeks ago, she has not had unprotected sex recently, reports consistent use of condoms.  No history of DVT, clotting disorder, migraine with aura, no tobacco use  No problem-specific Assessment & Plan notes found for this encounter.       Current medicines (including changes today)  Current Outpatient Prescriptions   Medication Sig Dispense Refill   • norelgestromin-ethinyl estradiol (ORTHO EVRA) 150-35 MCG/24HR patch 1 patch to the skin every 7 days x 3 then off 1 week 3 Patch 5     No current facility-administered medications for this visit.      She  has no past medical history of Anxiety; Arthritis; ASTHMA; Blood transfusion; Cancer (HCC); Depression; Diabetes; Hypertension; Kidney disease; Seizure (HCC); Thyroid disease; or Tuberculosis.    ROS included above     Objective:     /80 (BP Location: Left arm, Patient Position: Sitting, BP Cuff Size: Adult)   Pulse (!) 59   Temp 36.6 °C (97.9 °F) (Temporal)   Resp 16   Ht 1.575 m (5' 2\")   Wt 75.3 kg (166 lb)   SpO2 97%  Body mass index is 30.36 kg/m².     Physical Exam:  General: Alert, oriented in no acute distress.  Eye contact is good, speech is normal, affect calm  Lungs: clear to auscultation bilaterally, normal effort, no wheeze/ rhonchi/ rales.  CV: regular rate and rhythm, " S1, S2, no murmur  Ext: no edema, color normal, vascularity normal, temperature normal    Assessment and Plan:   The following treatment plan was discussed   1. Uses birth control   ultimately the patient would like to have a Nexplanon placed but she is requesting temporary birth control as she continues to work on obtaining this from the pharmacy.  We will start:  norelgestromin-ethinyl estradiol (ORTHO EVRA) 150-35 MCG/24HR patch  We discussed hormonal birth control can increase risk of blood clots, liver tumors, gallbladder disease, and stroke. Side effects can include headache, constipation nausea. They do not protect against STDs. Any unusual headache leg pain chest pain shortness of breath difficulty speaking or moving should be addressed immediately.           Followup: as needed         Please note that this dictation was created using voice recognition software. I have worked with consultants from the vendor as well as technical experts from [facility] [employer] to optimize the interface. I have made every reasonable attempt to correct obvious errors, but I expect that there are errors of grammar and possibly content that I did not discover before finalizing the note.

## 2019-04-26 NOTE — TELEPHONE ENCOUNTER
Patient came in for an appointment to see Megan today. She is waiting for NEXPLANON to come into the office, I just contacted [facility] at [phone]. They stated they just received a fax about this today, we do have record that this was faxed on 02/01/2019. Unfortunately this just got to them today, we should have an answer for an approval or denial next week.

## 2019-05-23 ENCOUNTER — OFFICE VISIT (OUTPATIENT)
Dept: MEDICAL GROUP | Facility: MEDICAL CENTER | Age: 24
End: 2019-05-23
Payer: COMMERCIAL

## 2019-05-23 VITALS
RESPIRATION RATE: 18 BRPM | TEMPERATURE: 97.4 F | WEIGHT: 167.8 LBS | DIASTOLIC BLOOD PRESSURE: 72 MMHG | SYSTOLIC BLOOD PRESSURE: 108 MMHG | OXYGEN SATURATION: 98 % | HEART RATE: 60 BPM | BODY MASS INDEX: 30.88 KG/M2 | HEIGHT: 62 IN

## 2019-05-23 DIAGNOSIS — Z30.017 NEXPLANON INSERTION: ICD-10-CM

## 2019-05-23 LAB
INT CON NEG: NEGATIVE
INT CON POS: POSITIVE
POC URINE PREGNANCY TEST: NEGATIVE

## 2019-05-23 PROCEDURE — 81025 URINE PREGNANCY TEST: CPT | Performed by: FAMILY MEDICINE

## 2019-05-23 PROCEDURE — 11981 INSERTION DRUG DLVR IMPLANT: CPT | Performed by: FAMILY MEDICINE

## 2019-05-29 NOTE — ASSESSMENT & PLAN NOTE
New problem, uncontrolled, patient would like to switch from oral contraceptive pills to Nexplanon.  Therefore, we have discussed the benefits, alternatives, and risks involved with Nexplanon, and how to transition from OCPs to Nexplanon, to use condoms for at least 1 week after discontinuing OCPs.  Patient verbalized understanding of all the above, would like to proceed with Nexplanon.

## 2019-05-29 NOTE — PROGRESS NOTES
"Subjective:   Chief Complaint/History of Present Illness:  Katty Howell is a 24 y.o. female established patient who presents today to discuss medical problems as listed below    The encounter diagnosis was Nexplanon insertion.    Nexplanon insertion  New problem, uncontrolled, patient would like to switch from oral contraceptive pills to Nexplanon.  Therefore, we have discussed the benefits, alternatives, and risks involved with Nexplanon, and how to transition from OCPs to Nexplanon, to use condoms for at least 1 week after discontinuing OCPs.  Patient verbalized understanding of all the above, would like to proceed with Nexplanon.      Patient Active Problem List    Diagnosis Date Noted   • Uses condoms 05/26/2017   • Weight gain 04/28/2017   • Obesity (BMI 30-39.9) 04/28/2017   • Nexplanon insertion 04/01/2016       Additional History:   Allergies:    Patient has no known allergies.     Current Medications:     Current Outpatient Prescriptions   Medication Sig Dispense Refill   • norelgestromin-ethinyl estradiol (ORTHO EVRA) 150-35 MCG/24HR patch 1 patch to the skin every 7 days x 3 then off 1 week (Patient not taking: Reported on 5/23/2019) 3 Patch 5     No current facility-administered medications for this visit.         Social History:     Social History   Substance Use Topics   • Smoking status: Never Smoker   • Smokeless tobacco: Never Used   • Alcohol use No       ROS:     - NOTE: All other systems reviewed and are negative, except as in HPI.     Objective:   Physical Exam:    Vitals: /72 (BP Location: Left arm, Patient Position: Sitting, BP Cuff Size: Adult)   Pulse 60   Temp 36.3 °C (97.4 °F) (Temporal)   Resp 18   Ht 1.575 m (5' 2\")   Wt 76.1 kg (167 lb 12.8 oz)   SpO2 98%    BMI: Body mass index is 30.69 kg/m².   General/Constitutional: Vitals as above, Well nourished, well developed female in no acute distress   Head/Eyes: Head is grossly normal & atraumatic, bilateral conjunctivae clear " and not injected, bilateral EOMI, bilateral PERRL   ENT: Bilateral external ears grossly normal in appearance, Hearing grossly intact, External nares normal in appearance and without discharge/bleeding   Respiratory: No respiratory distress, bilateral lungs are clear to ausculation in all lung fields (anterior/lateral/posterior), no wheezing/rhonchi/rales   Cardiovascular: Regular rate and rhythm without murmur/gallops/rubs, distal pulses are intact and equal bilaterally (radial, posterior tibial), no bilateral lower extremity edema   MSK: Gait grossly normal & not antalgic   Integumentary: No apparent rashes   Psych: Judgment grossly appropriate, no apparent depression/anxiety    Procedure: Nexplanon Placement    **NOTE: Sterile technique was maintained throughout procedure       1) Risks, benefits and alternatives of procedure and sequelae of placement were discussed with patient including, but not limited to:        A) Risks: Skin infection or scarring, paresthesias in arm, headaches, acne, breast pain, prolonged or irregular menstrual bleeding, weight gain, nausea, emesis, possible STDs in the future if patient is not using barrier contraception, migration of device which could cause damage to the arm or other organ systems.        B) Benefits: Long-acting reversible contraceptive that is >99% effective when placed properly, possible decreased menstrual flow        C) Alternatives: Continued use of current birth control method (OCPs), further discussion of birth control options, referral to OB/Gyn for placement.    2) Correct site was verified by patient and myself as left arm.  Insertion site was selected 8 - 10 cm from medial epicondyle, and a 5cm line was ulysses extending medially from this insertion site.    3) Area was cleaned using Chlorhexidine swabs with ulysses still visible.    4) 2mL of 2% lidocaine with epinephrine used for subcutaneous anesthesia.  Anesthesia confirmed by using pickups in the sterile  area away from and in area that was anesthetized.    5) Transparent protection cap removed from Nexplanon® insertion device.      6) Countertraction placed onto skin using my non-dominant hand throughout insertion:   - Needle inserted at marked insertion site at 30degree angle.   - Once needle punctured skin, applicator was moved to horizontal position with relation to skin.   - Needle was advanced slowly using tenting motion until needle was inserted to full length.   - Applicator held firmly in place, and slider pulled back fully until it locked.    7) Applicator removed from field, and Nexplanon® palpated by provider and patient to assure satisfactory placement.    8) Topical antibiotic + pressure bandage placed over insertion site to decrease hematoma or bruising risk.    9) Patient advised to use condoms for at least 7days, as this is the probable time when Nexplanon® will be effective.    10) Results:         A) Minimal bleeding with good hemostasis achieved.        B) The patient tolerated the procedure well.        Health Maintenance:     -Not reviewed at this visit    Imaging/Labs:     -Point-of-care pregnancy test negative    Assessment and Plan:   1. Nexplanon insertion  New problem, uncontrolled, addressed with Nexplanon insertion as above.  Patient to follow-up with either myself or PCP or gynecologist for any questions or symptoms.   - POCT PREGNANCY        RTC: As needed.    PLEASE NOTE: This dictation was created using voice recognition software. I have made every reasonable attempt to correct obvious errors, but I expect that there are errors of grammar and possibly content that I did not discover before finalizing the note.

## 2019-06-17 ENCOUNTER — PATIENT MESSAGE (OUTPATIENT)
Dept: MEDICAL GROUP | Facility: MEDICAL CENTER | Age: 24
End: 2019-06-17

## 2019-06-18 ENCOUNTER — PATIENT MESSAGE (OUTPATIENT)
Dept: MEDICAL GROUP | Facility: MEDICAL CENTER | Age: 24
End: 2019-06-18

## 2019-06-18 NOTE — TELEPHONE ENCOUNTER
From: Katty Howell  To: KADE Wu  Sent: 6/18/2019 6:45 AM PDT  Subject: Non-Urgent Medical Question    Not to much. It's mostly like random spotting threw out the day  ----- Message -----  From: KADE Wu  Sent: 6/17/2019 3:32 PM PDT  To: Katty Howell  Subject: RE: Non-Urgent Medical Question  Hi Lauryn,  This usually gets better with time, it can take a while for your body to adjust, though. How heavily are you bleeding?  Megan TAYLOR      ----- Message -----   From: Katty Howell   Sent: 6/17/2019 11:29 AM PDT   To: KADE Wu  Subject: Non-Urgent Medical Question    TONY Ochoa i got my birth control placed in my arm   couple week's ago and got my period normal Now i wont stop spotting is there anything i can do??

## 2019-09-20 ENCOUNTER — HOSPITAL ENCOUNTER (OUTPATIENT)
Facility: MEDICAL CENTER | Age: 24
End: 2019-09-20
Attending: NURSE PRACTITIONER

## 2019-09-20 ENCOUNTER — OFFICE VISIT (OUTPATIENT)
Dept: MEDICAL GROUP | Facility: MEDICAL CENTER | Age: 24
End: 2019-09-20

## 2019-09-20 VITALS
HEART RATE: 66 BPM | SYSTOLIC BLOOD PRESSURE: 102 MMHG | DIASTOLIC BLOOD PRESSURE: 72 MMHG | RESPIRATION RATE: 16 BRPM | BODY MASS INDEX: 30.47 KG/M2 | OXYGEN SATURATION: 95 % | TEMPERATURE: 97.3 F | HEIGHT: 62 IN | WEIGHT: 165.57 LBS

## 2019-09-20 DIAGNOSIS — Z01.419 ENCOUNTER FOR GYNECOLOGICAL EXAMINATION WITHOUT ABNORMAL FINDING: ICD-10-CM

## 2019-09-20 DIAGNOSIS — Z97.5 BREAKTHROUGH BLEEDING ON NEXPLANON: ICD-10-CM

## 2019-09-20 DIAGNOSIS — N92.1 BREAKTHROUGH BLEEDING ON NEXPLANON: ICD-10-CM

## 2019-09-20 LAB — CYTOLOGY REG CYTOL: NORMAL

## 2019-09-20 PROCEDURE — 88175 CYTOPATH C/V AUTO FLUID REDO: CPT

## 2019-09-20 PROCEDURE — 99395 PREV VISIT EST AGE 18-39: CPT | Performed by: NURSE PRACTITIONER

## 2019-09-20 NOTE — PROGRESS NOTES
CC:  Pap/Well Woman Exam    History of present illness:  Katty Howell is 24 y.o. G1, P1 female presenting today for well woman exam with gynecological exam and Pap smear.  Last Pap about 3 years ago and normal.  And a consistent relationship, denies concerns for STDs.  Nexplanon was placed in May of this year, she had been doing well up until about 2 weeks ago when she started having spotting.  Spotting has been light, unpredictable.  No significant cramping or pelvic pain.  No pain with intercourse  No problem-specific Assessment & Plan notes found for this encounter.      No past medical history on file.    Past Surgical History:   Procedure Laterality Date   • BUNIONECTOMY  7/10/2009    Performed by CARLI BLUNT at SURGERY SAME DAY [facility]       Outpatient Encounter Medications as of 9/20/2019   Medication Sig Dispense Refill   • norelgestromin-ethinyl estradiol (ORTHO EVRA) 150-35 MCG/24HR patch 1 patch to the skin every 7 days x 3 then off 1 week (Patient not taking: Reported on 5/23/2019) 3 Patch 5     No facility-administered encounter medications on file as of 9/20/2019.        Patient Active Problem List    Diagnosis Date Noted   • Breakthrough bleeding on Nexplanon 09/20/2019   • Uses condoms 05/26/2017   • Weight gain 04/28/2017   • Obesity (BMI 30-39.9) 04/28/2017   • Nexplanon insertion 04/01/2016       .  Social History     Socioeconomic History   • Marital status: Single     Spouse name: Not on file   • Number of children: Not on file   • Years of education: Not on file   • Highest education level: Not on file   Occupational History   • Not on file   Social Needs   • Financial resource strain: Not on file   • Food insecurity:     Worry: Not on file     Inability: Not on file   • Transportation needs:     Medical: Not on file     Non-medical: Not on file   Tobacco Use   • Smoking status: Never Smoker   • Smokeless tobacco: Never Used   Substance and Sexual Activity   • Alcohol use: No      "Alcohol/week: 0.0 oz   • Drug use: No   • Sexual activity: Yes     Partners: Male     Birth control/protection: Injection   Lifestyle   • Physical activity:     Days per week: Not on file     Minutes per session: Not on file   • Stress: Not on file   Relationships   • Social connections:     Talks on phone: Not on file     Gets together: Not on file     Attends [employer] service: Not on file     Active member of club or organization: Not on file     Attends meetings of clubs or organizations: Not on file     Relationship status: Not on file   • Intimate partner violence:     Fear of current or ex partner: Not on file     Emotionally abused: Not on file     Physically abused: Not on file     Forced sexual activity: Not on file   Other Topics Concern   • Not on file   Social History Narrative   • Not on file       No family history on file.      ROS:  Denies Weight loss, fatigue, chest pain, SOB, bowel or bladder changes. No significant dysmenorrhea, concerning vaginal discharge or irritation, no dyspareunia or postcoital bleeding. Denies h/o migraine with aura. Denies musculoskeletal, neurological, or psychiatric problems.      /72 (BP Location: Left arm, Patient Position: Sitting, BP Cuff Size: Adult)   Pulse 66   Temp 36.3 °C (97.3 °F) (Temporal)   Resp 16   Ht 1.575 m (5' 2\")   Wt 75.1 kg (165 lb 9.1 oz)   SpO2 95%   BMI 30.28 kg/m²     GEN:  Appears well and in no apparent distress   NECK:  Supple without adenopathy or thyromegaly  LUNGS:  Clear and equal. No wheeze, ronchi, or rales.  CV:  RRR, S1, S2. No murmur.  Pedal pulses 2+ bilaterally.  BREAST:  Symmetrical without masses. No nipple discharge.  ABD:  Soft, non-tender, non-distended, normal bowel sounds.  No hepatosplenomegaly.  :  Normal external female genitalia.  Vaginal canal with blood.  Cervix appears normal. Specimen collected from transformation zone. Bimanual exam:  No CMT, normal size uterus without masses or tenderness; no adnexal " masses or tenderness.      Assessment and plan    1. Encounter for gynecological examination without abnormal finding  Normal GYN exam. Pap sent, follow-up pending results. Breast self-exam taught and encouraged monthly. General health and wellness discussion including healthy diet, regular exercise    - THINPREP PAP,REFLEX HPV ON ASC-US ONLY; Future    2. Breakthrough bleeding on Nexplanon  Reassurance given.  Monitor for now, advised follow-up if this becomes more problematic      F/u pending results

## 2020-05-22 ENCOUNTER — APPOINTMENT (OUTPATIENT)
Dept: MEDICAL GROUP | Facility: MEDICAL CENTER | Age: 25
End: 2020-05-22

## 2021-01-26 ENCOUNTER — PATIENT MESSAGE (OUTPATIENT)
Dept: MEDICAL GROUP | Facility: MEDICAL CENTER | Age: 26
End: 2021-01-26

## 2021-02-12 ENCOUNTER — OFFICE VISIT (OUTPATIENT)
Dept: MEDICAL GROUP | Facility: MEDICAL CENTER | Age: 26
End: 2021-02-12

## 2021-02-12 VITALS
SYSTOLIC BLOOD PRESSURE: 124 MMHG | TEMPERATURE: 98.1 F | HEART RATE: 60 BPM | BODY MASS INDEX: 30.73 KG/M2 | HEIGHT: 62 IN | DIASTOLIC BLOOD PRESSURE: 78 MMHG | RESPIRATION RATE: 18 BRPM | OXYGEN SATURATION: 96 % | WEIGHT: 167 LBS

## 2021-02-12 DIAGNOSIS — Z00.00 ANNUAL PHYSICAL EXAM: ICD-10-CM

## 2021-02-12 DIAGNOSIS — N92.6 IRREGULAR MENSES: ICD-10-CM

## 2021-02-12 PROCEDURE — 99395 PREV VISIT EST AGE 18-39: CPT | Performed by: NURSE PRACTITIONER

## 2021-02-12 ASSESSMENT — PATIENT HEALTH QUESTIONNAIRE - PHQ9: CLINICAL INTERPRETATION OF PHQ2 SCORE: 0

## 2021-02-12 NOTE — PROGRESS NOTES
"Subjective:     Chief Complaint   Patient presents with   • Menstrual Problem     Katty Howell is a 25 y.o. female here today requesting well exam.  Pap completed in 2019 and normal.  She is exercising regularly, sleeping well.  Following a healthy diet.  Not taking supplements  Irregular menses  Her menstrual cycle has been irregular since having Nexplanon implanted.  Sometimes she skips a month, or will have 2 menses within a month.  It has not been problematic for her.       Current medicines (including changes today)  No current outpatient medications on file.     No current facility-administered medications for this visit.     She  has no past medical history of Anxiety, Arthritis, ASTHMA, Blood transfusion, Cancer (HCC), Depression, Diabetes, Hypertension, Kidney disease, Seizure (HCC), Thyroid disease, or Tuberculosis.    ROS included above     Objective:     /78 (BP Location: Right arm, Patient Position: Sitting, BP Cuff Size: Adult)   Pulse 60   Temp 36.7 °C (98.1 °F) (Temporal)   Resp 18   Ht 1.575 m (5' 2\")   Wt 75.8 kg (167 lb)   SpO2 96%  Body mass index is 30.54 kg/m².     Physical Exam:  General: Alert, oriented in no acute distress.  Eye contact is good, speech is normal, affect calm  HEENT: Oral mucosa pink moist, no lesions. TMs gray with good landmarks bilaterally. No lymphadenopathy.  Lungs: clear to auscultation bilaterally, normal effort, no wheeze/ rhonchi/ rales.  CV: regular rate and rhythm, S1, S2, no murmur  Abdomen: soft, nontender, No CVAT, no hepatosplenomegaly  Ext: no edema, color normal, vascularity normal, temperature normal    Assessment and Plan:   The following treatment plan was discussed   1. Annual physical exam  Normal physical exam. General health and wellness discussion including healthy diet, regular exercise. 2000 iu Vit d3 advised daily.  Advised regular dental cleanings, eye exam yearly.      2. Irregular menses  nexplanon in place. Discussed irregularity " as side effect. Overall not problematic.      Followup: annually         Please note that this dictation was created using voice recognition software. I have worked with consultants from the vendor as well as technical experts from [facility] to optimize the interface. I have made every reasonable attempt to correct obvious errors, but I expect that there are errors of grammar and possibly content that I did not discover before finalizing the note.

## 2021-02-12 NOTE — ASSESSMENT & PLAN NOTE
Her menstrual cycle has been irregular since having Nexplanon implanted.  Sometimes she skips a month, or will have 2 menses within a month.  It has not been problematic for her.

## 2021-05-17 ENCOUNTER — PATIENT MESSAGE (OUTPATIENT)
Dept: MEDICAL GROUP | Facility: MEDICAL CENTER | Age: 26
End: 2021-05-17

## 2021-05-19 ENCOUNTER — PATIENT MESSAGE (OUTPATIENT)
Dept: MEDICAL GROUP | Facility: MEDICAL CENTER | Age: 26
End: 2021-05-19

## 2021-05-19 NOTE — TELEPHONE ENCOUNTER
From: Katty Howell  To: Nurse Practitioner Gabriela Cotton  Sent: 5/19/2021 11:57 AM PDT  Subject: RE:Hello,    Ok great ! yea i would rather have it done with you. Also i need an OB letter do you know how i can get that or can you make me a letter      ----- Message -----   From:Nurse Practitioner Gabriela Cotton   Sent:5/18/2021 9:25 AM PDT   To:Katty Howell   Subject:RE:Shaun Yarbrough,  I am trained in removing Nexplanon if you would like to schedule with me. Or you can schedule with anyone in the OB/GYN office.  Megan TAYLOR      ----- Message -----   From:Katty Howell   Sent:5/18/2021 9:15 AM PDT   To:Nurse Practitioner Gabriela Cotton   Subject:RE:Hello,    Ok Thank you.      ----- Message -----   From:Nurse Practitioner Gabriela Cotton   Sent:5/17/2021 2:20 PM PDT   To:Katty Howell   Subject:Zenon,    Thank you for your message! I am currently out of the office so there may be a delayed response. Your message has been passed on to another provider to assist in my absence.    Thank you,  KADE Wu      ----- Message -----   From:Katty Howell   Sent:5/17/2021 2:20 PM PDT   To:Nurse Practitioner Gabriela Cotton   Subject:Procedure Question    Good afternoon Gabriela if I want my Nexplanon removed do you know who i can go to to have it taken out

## 2021-05-19 NOTE — PROGRESS NOTES
Please call and schedule appointment for Nexplanon removal.  I had like her to be in the last patient of the day so we have plenty of time

## 2021-06-09 ENCOUNTER — OFFICE VISIT (OUTPATIENT)
Dept: MEDICAL GROUP | Facility: MEDICAL CENTER | Age: 26
End: 2021-06-09

## 2021-06-09 VITALS
DIASTOLIC BLOOD PRESSURE: 70 MMHG | BODY MASS INDEX: 28.36 KG/M2 | RESPIRATION RATE: 16 BRPM | TEMPERATURE: 97.6 F | SYSTOLIC BLOOD PRESSURE: 110 MMHG | HEART RATE: 83 BPM | HEIGHT: 63 IN | OXYGEN SATURATION: 100 % | WEIGHT: 160.05 LBS

## 2021-06-09 DIAGNOSIS — Z30.46 NEXPLANON REMOVAL: ICD-10-CM

## 2021-06-09 PROCEDURE — 11982 REMOVE DRUG IMPLANT DEVICE: CPT | Performed by: NURSE PRACTITIONER

## 2021-06-09 NOTE — PROGRESS NOTES
"Subjective:     Chief Complaint   Patient presents with   • Advice Only     nexplanon removal, had for 1.5 years     Katty Hwoell is a 26 y.o. female here requesting Nexplanon removal.  She has had this in place for 1-1/2 years, is having very heavy menses.  Also planning for surrogacy later this year    No problem-specific Assessment & Plan notes found for this encounter.       Current medicines (including changes today)  No current outpatient medications on file.     No current facility-administered medications for this visit.     She  has no past medical history of Anxiety, Arthritis, ASTHMA, Blood transfusion, Cancer (HCC), Depression, Diabetes, Hypertension, Kidney disease, Seizure (HCC), Thyroid disease, or Tuberculosis.    ROS included above     Objective:     /70 (BP Location: Left arm, Patient Position: Sitting, BP Cuff Size: Adult)   Pulse 83   Temp 36.4 °C (97.6 °F) (Temporal)   Resp 16   Ht 1.59 m (5' 2.6\")   Wt 72.6 kg (160 lb 0.9 oz)   SpO2 100%  Body mass index is 28.72 kg/m².   Procedure:  Nexplanon implant palpated superficially in the left upper arm.  Area cleansed with chlorhexidine.  Local anesthesia obtained using 2% Xylocaine without epi.  2 mm incision made using scalpel at the distal end of implant, some difficulty grasping.  Additional 2 cm incision was made closer to the aspect of the implant.  Unable to grasp and easily remove.  Nexplanon measures 4 cm.  Minimal bleeding, Steri-Strip and pressure dressing applied    Assessment and Plan:   The following treatment plan was discussed   1. Nexplanon removal   removed as discussed above, well-tolerated.  We have discussed that fertility can return immediately after Nexplanon removal, advised condom use. Planning for pregnancy later this year  Advised to leave pressure dressing in place x 24 hrs. S/s infection reviewed.       Followup: as needed         Please note that this dictation was created using voice recognition software. " I have worked with consultants from the vendor as well as technical experts from [facility] to optimize the interface. I have made every reasonable attempt to correct obvious errors, but I expect that there are errors of grammar and possibly content that I did not discover before finalizing the note.

## 2022-08-26 ENCOUNTER — HOSPITAL ENCOUNTER (OUTPATIENT)
Facility: MEDICAL CENTER | Age: 27
End: 2022-08-26
Attending: OBSTETRICS & GYNECOLOGY

## 2022-08-26 PROCEDURE — 82105 ALPHA-FETOPROTEIN SERUM: CPT

## 2022-08-26 PROCEDURE — 36415 COLL VENOUS BLD VENIPUNCTURE: CPT

## 2022-09-08 LAB
# FETUSES US: NORMAL
AFP MOM SERPL: 1.72
AFP SERPL-MCNC: 73 NG/ML
AGE - REPORTED: 27.7 YR
CURRENT SMOKER: NO
FAMILY MEMBER DISEASES HX: NO
GA METHOD: NORMAL
GA: NORMAL WK
IDDM PATIENT QL: NO
INTEGRATED SCN PATIENT-IMP: NORMAL
SPECIMEN DRAWN SERPL: NORMAL

## 2022-10-07 ENCOUNTER — HOSPITAL ENCOUNTER (OUTPATIENT)
Facility: MEDICAL CENTER | Age: 27
End: 2022-10-07
Attending: OBSTETRICS & GYNECOLOGY
Payer: MEDICAID

## 2022-10-07 LAB
GLUCOSE 1H P 50 G GLC PO SERPL-MCNC: 137 MG/DL (ref 70–139)
HCT VFR BLD AUTO: 36 % (ref 37–47)
HGB BLD-MCNC: 12 G/DL (ref 12–16)
PLATELET # BLD AUTO: 197 K/UL (ref 164–446)
T PALLIDUM AB SER QL IA: NORMAL

## 2022-10-07 PROCEDURE — 86780 TREPONEMA PALLIDUM: CPT

## 2022-10-07 PROCEDURE — 82950 GLUCOSE TEST: CPT

## 2022-10-07 PROCEDURE — 85018 HEMOGLOBIN: CPT

## 2022-10-07 PROCEDURE — 85014 HEMATOCRIT: CPT

## 2022-10-07 PROCEDURE — 36415 COLL VENOUS BLD VENIPUNCTURE: CPT

## 2022-10-07 PROCEDURE — 85049 AUTOMATED PLATELET COUNT: CPT

## 2022-12-05 ENCOUNTER — HOSPITAL ENCOUNTER (EMERGENCY)
Facility: MEDICAL CENTER | Age: 27
End: 2022-12-05
Attending: OBSTETRICS & GYNECOLOGY | Admitting: OBSTETRICS & GYNECOLOGY
Payer: COMMERCIAL

## 2022-12-05 VITALS
TEMPERATURE: 97.8 F | RESPIRATION RATE: 16 BRPM | SYSTOLIC BLOOD PRESSURE: 119 MMHG | HEIGHT: 62 IN | HEART RATE: 82 BPM | BODY MASS INDEX: 40.12 KG/M2 | WEIGHT: 218 LBS | OXYGEN SATURATION: 96 % | DIASTOLIC BLOOD PRESSURE: 75 MMHG

## 2022-12-05 LAB
APPEARANCE UR: CLEAR
COLOR UR AUTO: YELLOW
GLUCOSE UR QL STRIP.AUTO: NEGATIVE MG/DL
KETONES UR QL STRIP.AUTO: NEGATIVE MG/DL
LEUKOCYTE ESTERASE UR QL STRIP.AUTO: NEGATIVE
NITRITE UR QL STRIP.AUTO: NEGATIVE
PH UR STRIP.AUTO: 6 [PH] (ref 5–8)
PROT UR QL STRIP: NEGATIVE MG/DL
RBC UR QL AUTO: ABNORMAL
SP GR UR STRIP.AUTO: <=1.005 (ref 1–1.03)

## 2022-12-05 PROCEDURE — 81002 URINALYSIS NONAUTO W/O SCOPE: CPT

## 2022-12-05 PROCEDURE — 59025 FETAL NON-STRESS TEST: CPT

## 2022-12-05 PROCEDURE — 302449 STATCHG TRIAGE ONLY (STATISTIC)

## 2022-12-06 NOTE — PROGRESS NOTES
, GA 32w?d. Pt presents to L&D with c/o decreased fetal movement and abdominal cramping, unsure if they are UCs. Pt denies LOF or VB and reports + fetal movement. Pt escorted to triage room, oriented to room and unit, and external monitors applied. POC discussed with pt and encouraged to state needs or questions at any time.     1639 Dr. Fuentes given report, order received for SVE.    1700 SVE by this RN 1/thick/    1750 Pt reports feeling normal fetal movement at this time. Dr. Fuentes updated, D/C order received.    175 L&D D/C instructions including pelvic rest reviewed with pt and s/o who state understanding. Pt d/c to self with s/o.

## 2022-12-23 NOTE — H&P
DATE OF ADMISSION:  2022     ADMITTING DIAGNOSES:  1.  Intrauterine pregnancy at 37 and 1/7 weeks.  2.  Diamniotic dichorionic twin gestation.  3.  Malpresentation.  4.  Desires permanent sterility.  5.  Polyhydramnios for twin A.  6.  Obesity.     HISTORY OF PRESENT ILLNESS:  This is a 27-year-old  2, para 1 with an   estimated date of confinement of 2023 established by last menstrual   period consistent with an 11-week ultrasound, who will be presenting to [facility] on 2022 for a primary    section and bilateral salpingectomy.  Her pregnancy is diamniotic   dichorionic twin gestation and has been now presentation of twin B.  She also   has polyhydramnios of twin A and the recommendation is made for primary    section.  The patient is also requesting to be permanently   sterilized.  The pros and cons, risks and benefits of these procedures have   been reviewed with the patient and include but are not limited to bleeding,   infection, damage to bowel, bladder, nerves, the babies or other organs, need   for life-saving blood transfusion or hysterectomy, complications with future   pregnancies, complications with anesthesia and death.  The bilateral   salpingectomy carries a risk of a failure rate of 1 in 100, a 50% chance risk   of an ectopic pregnancy and issue of regret.  All the patient's questions have   been answered and consent forms have been signed.     Prenatal care has been with Dr. Fuentes.  Her estimated date of confinement of   2023 was established by last menstrual period consistent with an 11-week   ultrasound.     PRENATAL LABS:  Her blood type is O positive, antibody screen negative, RPR   nonreactive, rubella immune, hepatitis B surface antigen negative, hepatitis C   negative, HIV negative.  Her 1-hour Glucola was normal.  Her group B strep   status is negative.  Her noninvasive  screening was low risk  for both   fetuses.  Her AFP only was also low risk.  She declines recessive gene   screen.     Complications with the pregnancy include diamniotic dichorionic twin gestation   for which she has been [facility]   that has been concordant growth, but recently Twin A has developed   polyhydramnios.  She has anterior placentas with no previa.  Her total weight   gain for the pregnancy has been 59 pounds.  She did have a Pap test during her   pregnancy showing ASCUS, cannot rule out high-grade dysplasia and HPV   positive.  She has been followed with colposcopy, which has been consistent   with moderate dysplasia.     ALLERGIES:  She has no known drug allergies.     MEDICATIONS:  Include prenatal vitamins.     PAST MEDICAL HISTORY:  Negative.     PAST SURGICAL HISTORY:  Significant for right foot surgery.     SOCIAL HISTORY:  She denies tobacco, alcohol or IV drug use.     FAMILY HISTORY:  She has no family history of GYN or colon cancer.     GYNECOLOGIC HISTORY:  She has no history of any HSV.  She was diagnosed with   ASCUS, cannot rule out high-grade dysplasia and HPV during this pregnancy.    Prior to that, she had no abnormal Pap tests.     OBSTETRICAL HISTORY:   1 was a normal spontaneous vaginal delivery, 7   pounds 4 ounces.   2 is her current pregnancy.     PHYSICAL EXAMINATION:  VITAL SIGNS:  She is 62 inches tall.  She most recently weighed 231 pounds.    Blood pressure is 114/66.  GENERAL:  She is pleasant, in no apparent distress.  LUNGS:  Clear to auscultation bilaterally.  CARDIOVASCULAR:  Regular rate and rhythm.  ABDOMEN:  Gravid and nontender.  EXTREMITIES:  Ultrasound shows a vertex presentation of twin A with   polyhydramnios, breech presentation of twin B.     IMPRESSION:  This is a 27-year-old  2, para 1 with an estimated date of   confinement of 2023 established by last menstrual period consistent   with first trimester ultrasound,  who will be presenting to [facility] on 2022 for primary  section   and bilateral salpingectomy secondary to malpresentation and polyhydramnios in   twin gestation and desires permanent sterility.     PLAN:  1.  The patient will be admitted to labor and delivery on 2022.  2.  She will be n.p.o. in accordance with anesthesia's guidelines.  3.  Consent forms have been signed and are on record in our office.        ______________________________  MD ARNEL TERESA/KASANDRA/ANNABELLA    DD:  2022 09:14  DT:  2022 10:28    Job#:  [id number]

## 2022-12-30 ENCOUNTER — HOSPITAL ENCOUNTER (INPATIENT)
Facility: MEDICAL CENTER | Age: 27
LOS: 2 days | End: 2023-01-01
Attending: OBSTETRICS & GYNECOLOGY | Admitting: OBSTETRICS & GYNECOLOGY
Payer: COMMERCIAL

## 2022-12-30 ENCOUNTER — ANESTHESIA EVENT (OUTPATIENT)
Dept: OBGYN | Facility: MEDICAL CENTER | Age: 27
End: 2022-12-30
Payer: COMMERCIAL

## 2022-12-30 ENCOUNTER — ANESTHESIA (OUTPATIENT)
Dept: OBGYN | Facility: MEDICAL CENTER | Age: 27
End: 2022-12-30
Payer: COMMERCIAL

## 2022-12-30 DIAGNOSIS — Z09 SURGERY FOLLOW-UP: ICD-10-CM

## 2022-12-30 LAB
BASOPHILS # BLD AUTO: 0.3 % (ref 0–1.8)
BASOPHILS # BLD: 0.02 K/UL (ref 0–0.12)
EOSINOPHIL # BLD AUTO: 0.04 K/UL (ref 0–0.51)
EOSINOPHIL NFR BLD: 0.6 % (ref 0–6.9)
ERYTHROCYTE [DISTWIDTH] IN BLOOD BY AUTOMATED COUNT: 43.5 FL (ref 35.9–50)
HCT VFR BLD AUTO: 39.1 % (ref 37–47)
HGB BLD-MCNC: 13.3 G/DL (ref 12–16)
HOLDING TUBE BB 8507: NORMAL
IMM GRANULOCYTES # BLD AUTO: 0.07 K/UL (ref 0–0.11)
IMM GRANULOCYTES NFR BLD AUTO: 1.1 % (ref 0–0.9)
LYMPHOCYTES # BLD AUTO: 1.61 K/UL (ref 1–4.8)
LYMPHOCYTES NFR BLD: 24.7 % (ref 22–41)
MCH RBC QN AUTO: 30.6 PG (ref 27–33)
MCHC RBC AUTO-ENTMCNC: 34 G/DL (ref 33.6–35)
MCV RBC AUTO: 90.1 FL (ref 81.4–97.8)
MONOCYTES # BLD AUTO: 0.55 K/UL (ref 0–0.85)
MONOCYTES NFR BLD AUTO: 8.4 % (ref 0–13.4)
NEUTROPHILS # BLD AUTO: 4.24 K/UL (ref 2–7.15)
NEUTROPHILS NFR BLD: 64.9 % (ref 44–72)
NRBC # BLD AUTO: 0 K/UL
NRBC BLD-RTO: 0 /100 WBC
PLATELET # BLD AUTO: 166 K/UL (ref 164–446)
PMV BLD AUTO: 12.5 FL (ref 9–12.9)
RBC # BLD AUTO: 4.34 M/UL (ref 4.2–5.4)
T PALLIDUM AB SER QL IA: NORMAL
WBC # BLD AUTO: 6.5 K/UL (ref 4.8–10.8)

## 2022-12-30 PROCEDURE — 700105 HCHG RX REV CODE 258: Performed by: ANESTHESIOLOGY

## 2022-12-30 PROCEDURE — 700102 HCHG RX REV CODE 250 W/ 637 OVERRIDE(OP): Performed by: ANESTHESIOLOGY

## 2022-12-30 PROCEDURE — 85025 COMPLETE CBC W/AUTO DIFF WBC: CPT

## 2022-12-30 PROCEDURE — 0UT70ZZ RESECTION OF BILATERAL FALLOPIAN TUBES, OPEN APPROACH: ICD-10-PCS | Performed by: OBSTETRICS & GYNECOLOGY

## 2022-12-30 PROCEDURE — 700101 HCHG RX REV CODE 250: Performed by: ANESTHESIOLOGY

## 2022-12-30 PROCEDURE — A9270 NON-COVERED ITEM OR SERVICE: HCPCS | Performed by: ANESTHESIOLOGY

## 2022-12-30 PROCEDURE — 36415 COLL VENOUS BLD VENIPUNCTURE: CPT

## 2022-12-30 PROCEDURE — 160009 HCHG ANES TIME/MIN: Performed by: OBSTETRICS & GYNECOLOGY

## 2022-12-30 PROCEDURE — 700111 HCHG RX REV CODE 636 W/ 250 OVERRIDE (IP): Performed by: ANESTHESIOLOGY

## 2022-12-30 PROCEDURE — 160002 HCHG RECOVERY MINUTES (STAT): Performed by: OBSTETRICS & GYNECOLOGY

## 2022-12-30 PROCEDURE — 160048 HCHG OR STATISTICAL LEVEL 1-5: Performed by: OBSTETRICS & GYNECOLOGY

## 2022-12-30 PROCEDURE — 01961 ANES CESAREAN DELIVERY ONLY: CPT | Performed by: ANESTHESIOLOGY

## 2022-12-30 PROCEDURE — 160029 HCHG SURGERY MINUTES - 1ST 30 MINS LEVEL 4: Performed by: OBSTETRICS & GYNECOLOGY

## 2022-12-30 PROCEDURE — C1755 CATHETER, INTRASPINAL: HCPCS | Performed by: OBSTETRICS & GYNECOLOGY

## 2022-12-30 PROCEDURE — 160041 HCHG SURGERY MINUTES - EA ADDL 1 MIN LEVEL 4: Performed by: OBSTETRICS & GYNECOLOGY

## 2022-12-30 PROCEDURE — 88302 TISSUE EXAM BY PATHOLOGIST: CPT | Mod: 59

## 2022-12-30 PROCEDURE — 86780 TREPONEMA PALLIDUM: CPT

## 2022-12-30 PROCEDURE — 700111 HCHG RX REV CODE 636 W/ 250 OVERRIDE (IP): Performed by: OBSTETRICS & GYNECOLOGY

## 2022-12-30 PROCEDURE — 160035 HCHG PACU - 1ST 60 MINS PHASE I: Performed by: OBSTETRICS & GYNECOLOGY

## 2022-12-30 PROCEDURE — 770002 HCHG ROOM/CARE - OB PRIVATE (112)

## 2022-12-30 RX ORDER — CALCIUM CARBONATE 500 MG/1
1000 TABLET, CHEWABLE ORAL EVERY 6 HOURS PRN
Status: DISCONTINUED | OUTPATIENT
Start: 2022-12-30 | End: 2023-01-01 | Stop reason: HOSPADM

## 2022-12-30 RX ORDER — SODIUM CHLORIDE, SODIUM LACTATE, POTASSIUM CHLORIDE, CALCIUM CHLORIDE 600; 310; 30; 20 MG/100ML; MG/100ML; MG/100ML; MG/100ML
INJECTION, SOLUTION INTRAVENOUS ONCE
Status: ACTIVE | OUTPATIENT
Start: 2022-12-30 | End: 2022-12-31

## 2022-12-30 RX ORDER — OXYCODONE HYDROCHLORIDE 5 MG/1
5 TABLET ORAL EVERY 4 HOURS PRN
Status: DISCONTINUED | OUTPATIENT
Start: 2022-12-31 | End: 2023-01-01 | Stop reason: HOSPADM

## 2022-12-30 RX ORDER — ONDANSETRON 2 MG/ML
4 INJECTION INTRAMUSCULAR; INTRAVENOUS EVERY 6 HOURS PRN
Status: DISCONTINUED | OUTPATIENT
Start: 2022-12-31 | End: 2023-01-01 | Stop reason: HOSPADM

## 2022-12-30 RX ORDER — SODIUM CHLORIDE, SODIUM LACTATE, POTASSIUM CHLORIDE, CALCIUM CHLORIDE 600; 310; 30; 20 MG/100ML; MG/100ML; MG/100ML; MG/100ML
INJECTION, SOLUTION INTRAVENOUS PRN
Status: DISCONTINUED | OUTPATIENT
Start: 2022-12-30 | End: 2023-01-01 | Stop reason: HOSPADM

## 2022-12-30 RX ORDER — ACETAMINOPHEN 500 MG
1000 TABLET ORAL EVERY 6 HOURS
Status: COMPLETED | OUTPATIENT
Start: 2022-12-30 | End: 2022-12-31

## 2022-12-30 RX ORDER — OXYCODONE HYDROCHLORIDE 10 MG/1
10 TABLET ORAL EVERY 4 HOURS PRN
Status: ACTIVE | OUTPATIENT
Start: 2022-12-30 | End: 2022-12-31

## 2022-12-30 RX ORDER — BUPIVACAINE HYDROCHLORIDE 7.5 MG/ML
INJECTION, SOLUTION INTRASPINAL
Status: COMPLETED | OUTPATIENT
Start: 2022-12-30 | End: 2022-12-30

## 2022-12-30 RX ORDER — DEXAMETHASONE SODIUM PHOSPHATE 4 MG/ML
INJECTION, SOLUTION INTRA-ARTICULAR; INTRALESIONAL; INTRAMUSCULAR; INTRAVENOUS; SOFT TISSUE PRN
Status: DISCONTINUED | OUTPATIENT
Start: 2022-12-30 | End: 2022-12-30 | Stop reason: SURG

## 2022-12-30 RX ORDER — OXYTOCIN 10 [USP'U]/ML
10 INJECTION, SOLUTION INTRAMUSCULAR; INTRAVENOUS
Status: DISCONTINUED | OUTPATIENT
Start: 2022-12-30 | End: 2023-01-01 | Stop reason: HOSPADM

## 2022-12-30 RX ORDER — DIPHENHYDRAMINE HCL 25 MG
25 TABLET ORAL EVERY 6 HOURS PRN
Status: DISCONTINUED | OUTPATIENT
Start: 2022-12-31 | End: 2023-01-01 | Stop reason: HOSPADM

## 2022-12-30 RX ORDER — MORPHINE SULFATE 0.5 MG/ML
INJECTION, SOLUTION EPIDURAL; INTRATHECAL; INTRAVENOUS
Status: COMPLETED | OUTPATIENT
Start: 2022-12-30 | End: 2022-12-30

## 2022-12-30 RX ORDER — VITAMIN A ACETATE, BETA CAROTENE, ASCORBIC ACID, CHOLECALCIFEROL, .ALPHA.-TOCOPHEROL ACETATE, DL-, THIAMINE MONONITRATE, RIBOFLAVIN, NIACINAMIDE, PYRIDOXINE HYDROCHLORIDE, FOLIC ACID, CYANOCOBALAMIN, CALCIUM CARBONATE, FERROUS FUMARATE, ZINC OXIDE, CUPRIC OXIDE 3080; 12; 120; 400; 1; 1.84; 3; 20; 22; 920; 25; 200; 27; 10; 2 [IU]/1; UG/1; MG/1; [IU]/1; MG/1; MG/1; MG/1; MG/1; MG/1; [IU]/1; MG/1; MG/1; MG/1; MG/1; MG/1
1 TABLET, FILM COATED ORAL
Status: DISCONTINUED | OUTPATIENT
Start: 2022-12-31 | End: 2023-01-01 | Stop reason: HOSPADM

## 2022-12-30 RX ORDER — ONDANSETRON 2 MG/ML
4 INJECTION INTRAMUSCULAR; INTRAVENOUS EVERY 6 HOURS PRN
Status: ACTIVE | OUTPATIENT
Start: 2022-12-30 | End: 2022-12-31

## 2022-12-30 RX ORDER — METOCLOPRAMIDE HYDROCHLORIDE 5 MG/ML
10 INJECTION INTRAMUSCULAR; INTRAVENOUS ONCE
Status: COMPLETED | OUTPATIENT
Start: 2022-12-30 | End: 2022-12-30

## 2022-12-30 RX ORDER — SODIUM CHLORIDE, SODIUM GLUCONATE, SODIUM ACETATE, POTASSIUM CHLORIDE AND MAGNESIUM CHLORIDE 526; 502; 368; 37; 30 MG/100ML; MG/100ML; MG/100ML; MG/100ML; MG/100ML
INJECTION, SOLUTION INTRAVENOUS
Status: DISCONTINUED | OUTPATIENT
Start: 2022-12-30 | End: 2022-12-30 | Stop reason: SURG

## 2022-12-30 RX ORDER — DIPHENHYDRAMINE HYDROCHLORIDE 50 MG/ML
12.5 INJECTION INTRAMUSCULAR; INTRAVENOUS EVERY 6 HOURS PRN
Status: ACTIVE | OUTPATIENT
Start: 2022-12-30 | End: 2022-12-31

## 2022-12-30 RX ORDER — ONDANSETRON 4 MG/1
4 TABLET, ORALLY DISINTEGRATING ORAL EVERY 6 HOURS PRN
Status: DISCONTINUED | OUTPATIENT
Start: 2022-12-31 | End: 2023-01-01 | Stop reason: HOSPADM

## 2022-12-30 RX ORDER — SODIUM CHLORIDE, SODIUM GLUCONATE, SODIUM ACETATE, POTASSIUM CHLORIDE AND MAGNESIUM CHLORIDE 526; 502; 368; 37; 30 MG/100ML; MG/100ML; MG/100ML; MG/100ML; MG/100ML
1500 INJECTION, SOLUTION INTRAVENOUS ONCE
Status: COMPLETED | OUTPATIENT
Start: 2022-12-30 | End: 2022-12-30

## 2022-12-30 RX ORDER — DOCUSATE SODIUM 100 MG/1
100 CAPSULE, LIQUID FILLED ORAL 2 TIMES DAILY PRN
Status: DISCONTINUED | OUTPATIENT
Start: 2022-12-30 | End: 2023-01-01 | Stop reason: HOSPADM

## 2022-12-30 RX ORDER — BISACODYL 10 MG
10 SUPPOSITORY, RECTAL RECTAL PRN
Status: DISCONTINUED | OUTPATIENT
Start: 2022-12-30 | End: 2023-01-01 | Stop reason: HOSPADM

## 2022-12-30 RX ORDER — ACETAMINOPHEN 500 MG
1000 TABLET ORAL EVERY 6 HOURS
Status: DISCONTINUED | OUTPATIENT
Start: 2022-12-31 | End: 2023-01-01 | Stop reason: HOSPADM

## 2022-12-30 RX ORDER — HYDROMORPHONE HYDROCHLORIDE 1 MG/ML
0.2 INJECTION, SOLUTION INTRAMUSCULAR; INTRAVENOUS; SUBCUTANEOUS
Status: ACTIVE | OUTPATIENT
Start: 2022-12-30 | End: 2022-12-31

## 2022-12-30 RX ORDER — HYDROMORPHONE HYDROCHLORIDE 1 MG/ML
0.4 INJECTION, SOLUTION INTRAMUSCULAR; INTRAVENOUS; SUBCUTANEOUS
Status: ACTIVE | OUTPATIENT
Start: 2022-12-30 | End: 2022-12-31

## 2022-12-30 RX ORDER — ONDANSETRON 2 MG/ML
INJECTION INTRAMUSCULAR; INTRAVENOUS PRN
Status: DISCONTINUED | OUTPATIENT
Start: 2022-12-30 | End: 2022-12-30 | Stop reason: SURG

## 2022-12-30 RX ORDER — CITRIC ACID/SODIUM CITRATE 334-500MG
30 SOLUTION, ORAL ORAL ONCE
Status: COMPLETED | OUTPATIENT
Start: 2022-12-30 | End: 2022-12-30

## 2022-12-30 RX ORDER — IBUPROFEN 800 MG/1
800 TABLET ORAL EVERY 8 HOURS
Status: DISCONTINUED | OUTPATIENT
Start: 2022-12-31 | End: 2023-01-01 | Stop reason: HOSPADM

## 2022-12-30 RX ORDER — CEFAZOLIN SODIUM 1 G/3ML
2 INJECTION, POWDER, FOR SOLUTION INTRAMUSCULAR; INTRAVENOUS ONCE
Status: COMPLETED | OUTPATIENT
Start: 2022-12-30 | End: 2022-12-30

## 2022-12-30 RX ORDER — OXYCODONE HYDROCHLORIDE 5 MG/1
5 TABLET ORAL EVERY 4 HOURS PRN
Status: ACTIVE | OUTPATIENT
Start: 2022-12-30 | End: 2022-12-31

## 2022-12-30 RX ORDER — SIMETHICONE 125 MG
125 TABLET,CHEWABLE ORAL 4 TIMES DAILY PRN
Status: DISCONTINUED | OUTPATIENT
Start: 2022-12-30 | End: 2023-01-01 | Stop reason: HOSPADM

## 2022-12-30 RX ORDER — OXYTOCIN 10 [USP'U]/ML
10 INJECTION, SOLUTION INTRAMUSCULAR; INTRAVENOUS
Status: DISCONTINUED | OUTPATIENT
Start: 2022-12-30 | End: 2022-12-30

## 2022-12-30 RX ORDER — DIPHENHYDRAMINE HYDROCHLORIDE 50 MG/ML
25 INJECTION INTRAMUSCULAR; INTRAVENOUS EVERY 6 HOURS PRN
Status: ACTIVE | OUTPATIENT
Start: 2022-12-30 | End: 2022-12-31

## 2022-12-30 RX ORDER — TRANEXAMIC ACID 100 MG/ML
INJECTION, SOLUTION INTRAVENOUS PRN
Status: DISCONTINUED | OUTPATIENT
Start: 2022-12-30 | End: 2022-12-30 | Stop reason: SURG

## 2022-12-30 RX ORDER — IBUPROFEN 800 MG/1
800 TABLET ORAL EVERY 8 HOURS PRN
Status: DISCONTINUED | OUTPATIENT
Start: 2023-01-03 | End: 2023-01-01 | Stop reason: HOSPADM

## 2022-12-30 RX ORDER — KETOROLAC TROMETHAMINE 30 MG/ML
30 INJECTION, SOLUTION INTRAMUSCULAR; INTRAVENOUS EVERY 6 HOURS
Status: DISCONTINUED | OUTPATIENT
Start: 2022-12-30 | End: 2022-12-30

## 2022-12-30 RX ORDER — ACETAMINOPHEN 500 MG
1000 TABLET ORAL EVERY 6 HOURS PRN
Status: DISCONTINUED | OUTPATIENT
Start: 2023-01-03 | End: 2023-01-01 | Stop reason: HOSPADM

## 2022-12-30 RX ORDER — DIPHENHYDRAMINE HYDROCHLORIDE 50 MG/ML
25 INJECTION INTRAMUSCULAR; INTRAVENOUS EVERY 6 HOURS PRN
Status: DISCONTINUED | OUTPATIENT
Start: 2022-12-31 | End: 2023-01-01 | Stop reason: HOSPADM

## 2022-12-30 RX ORDER — OXYCODONE HYDROCHLORIDE 10 MG/1
10 TABLET ORAL EVERY 4 HOURS PRN
Status: DISCONTINUED | OUTPATIENT
Start: 2022-12-31 | End: 2023-01-01 | Stop reason: HOSPADM

## 2022-12-30 RX ADMIN — MORPHINE SULFATE 150 MCG: 0.5 INJECTION, SOLUTION EPIDURAL; INTRATHECAL; INTRAVENOUS at 14:55

## 2022-12-30 RX ADMIN — OXYTOCIN 1000 ML: 10 INJECTION, SOLUTION INTRAMUSCULAR; INTRAVENOUS at 15:24

## 2022-12-30 RX ADMIN — TRANEXAMIC ACID 1000 MG: 100 INJECTION, SOLUTION INTRAVENOUS at 15:14

## 2022-12-30 RX ADMIN — CEFAZOLIN 2 G: 330 INJECTION, POWDER, FOR SOLUTION INTRAMUSCULAR; INTRAVENOUS at 15:00

## 2022-12-30 RX ADMIN — BUPIVACAINE HYDROCHLORIDE IN DEXTROSE 1.7 ML: 7.5 INJECTION, SOLUTION SUBARACHNOID at 14:55

## 2022-12-30 RX ADMIN — SODIUM CITRATE AND CITRIC ACID MONOHYDRATE 30 ML: 500; 334 SOLUTION ORAL at 14:16

## 2022-12-30 RX ADMIN — METOCLOPRAMIDE 10 MG: 5 INJECTION, SOLUTION INTRAMUSCULAR; INTRAVENOUS at 14:16

## 2022-12-30 RX ADMIN — FAMOTIDINE 20 MG: 10 INJECTION INTRAVENOUS at 14:16

## 2022-12-30 RX ADMIN — PHENYLEPHRINE HYDROCHLORIDE 0.5 MCG/KG/MIN: 10 INJECTION INTRAVENOUS at 14:52

## 2022-12-30 RX ADMIN — SODIUM CHLORIDE, SODIUM GLUCONATE, SODIUM ACETATE, POTASSIUM CHLORIDE AND MAGNESIUM CHLORIDE: 526; 502; 368; 37; 30 INJECTION, SOLUTION INTRAVENOUS at 14:50

## 2022-12-30 RX ADMIN — DEXAMETHASONE SODIUM PHOSPHATE 8 MG: 4 INJECTION, SOLUTION INTRA-ARTICULAR; INTRALESIONAL; INTRAMUSCULAR; INTRAVENOUS; SOFT TISSUE at 15:24

## 2022-12-30 RX ADMIN — ACETAMINOPHEN 1000 MG: 500 TABLET ORAL at 20:07

## 2022-12-30 RX ADMIN — SODIUM CHLORIDE, SODIUM GLUCONATE, SODIUM ACETATE, POTASSIUM CHLORIDE AND MAGNESIUM CHLORIDE 1500 ML: 526; 502; 368; 37; 30 INJECTION, SOLUTION INTRAVENOUS at 14:18

## 2022-12-30 RX ADMIN — ONDANSETRON 4 MG: 2 INJECTION INTRAMUSCULAR; INTRAVENOUS at 16:00

## 2022-12-30 ASSESSMENT — PATIENT HEALTH QUESTIONNAIRE - PHQ9
SUM OF ALL RESPONSES TO PHQ9 QUESTIONS 1 AND 2: 0
2. FEELING DOWN, DEPRESSED, IRRITABLE, OR HOPELESS: NOT AT ALL
1. LITTLE INTEREST OR PLEASURE IN DOING THINGS: NOT AT ALL

## 2022-12-30 ASSESSMENT — PAIN SCALES - GENERAL: PAIN_LEVEL: 0

## 2022-12-30 ASSESSMENT — PAIN DESCRIPTION - PAIN TYPE
TYPE: ACUTE PAIN;SURGICAL PAIN
TYPE: ACUTE PAIN;SURGICAL PAIN

## 2022-12-30 ASSESSMENT — LIFESTYLE VARIABLES
ALCOHOL_USE: NO
EVER_SMOKED: NEVER

## 2022-12-30 NOTE — ANESTHESIA PREPROCEDURE EVALUATION
Case: [id number] Date/Time: 22 1310    Procedure: PRIMARY  SECTION, BILATERAL SALPINGECTOMY    Pre-op diagnosis:       TWINS, DESIRES STERILITY      38 1/7WEEKS    Location: D OR  / SURGERY LABOR AND DELIVERY    Surgeons: Kelsi Fuentes M.D.        26 yo  38+1 weeks twin pregnancy w/desired sterility    Relevant Problems   ANESTHESIA (within normal limits)       Physical Exam    Airway   Mallampati: II  TM distance: >3 FB  Neck ROM: full       Cardiovascular   Rhythm: regular  Rate: normal  (-) murmur     Dental - normal exam           Pulmonary   Breath sounds clear to auscultation     Abdominal    Neurological - normal exam                 Anesthesia Plan    ASA 2       Plan - spinal                   Postoperative Plan: Postoperative administration of opioids is intended.    Pertinent diagnostic labs and testing reviewed    Informed Consent:    Anesthetic plan and risks discussed with patient.    Use of blood products discussed with: patient whom consented to blood products.

## 2022-12-30 NOTE — ANESTHESIA PROCEDURE NOTES
Spinal Block    Date/Time: 12/30/2022 2:55 PM  Performed by: Quincy Linn M.D.  Authorized by: Isatu Noland M.D.     Patient Location:  OR  Start Time:  12/30/2022 2:55 PM  End Time:  12/30/2022 3:00 PM  Reason for Block: primary anesthetic    patient identified, IV checked, site marked, risks and benefits discussed, surgical consent, monitors and equipment checked, pre-op evaluation and timeout performed    Patient Position:  Sitting  Prep: ChloraPrep, patient draped and sterile technique    Monitoring:  Blood pressure, continuous pulse oximetry and heart rate  Approach:  Midline  Location:  L3-4  Injection Technique:  Single-shot  Skin infiltration:  Lidocaine  Strength:  1%  Dose:  3ml  Needle Type:  Pencan  Needle Gauge:  25 G  CSF flowing pre/post injection:  Yes  Sensory Level:  T4

## 2022-12-31 LAB
ERYTHROCYTE [DISTWIDTH] IN BLOOD BY AUTOMATED COUNT: 44.2 FL (ref 35.9–50)
HCT VFR BLD AUTO: 33.9 % (ref 37–47)
HGB BLD-MCNC: 11.6 G/DL (ref 12–16)
MCH RBC QN AUTO: 30.7 PG (ref 27–33)
MCHC RBC AUTO-ENTMCNC: 34.2 G/DL (ref 33.6–35)
MCV RBC AUTO: 89.7 FL (ref 81.4–97.8)
PLATELET # BLD AUTO: 160 K/UL (ref 164–446)
PMV BLD AUTO: 12.4 FL (ref 9–12.9)
RBC # BLD AUTO: 3.78 M/UL (ref 4.2–5.4)
WBC # BLD AUTO: 10.4 K/UL (ref 4.8–10.8)

## 2022-12-31 PROCEDURE — A9270 NON-COVERED ITEM OR SERVICE: HCPCS | Performed by: ANESTHESIOLOGY

## 2022-12-31 PROCEDURE — 36415 COLL VENOUS BLD VENIPUNCTURE: CPT

## 2022-12-31 PROCEDURE — 700102 HCHG RX REV CODE 250 W/ 637 OVERRIDE(OP): Performed by: OBSTETRICS & GYNECOLOGY

## 2022-12-31 PROCEDURE — 85027 COMPLETE CBC AUTOMATED: CPT

## 2022-12-31 PROCEDURE — 770002 HCHG ROOM/CARE - OB PRIVATE (112)

## 2022-12-31 PROCEDURE — A9270 NON-COVERED ITEM OR SERVICE: HCPCS | Performed by: OBSTETRICS & GYNECOLOGY

## 2022-12-31 PROCEDURE — 700102 HCHG RX REV CODE 250 W/ 637 OVERRIDE(OP): Performed by: ANESTHESIOLOGY

## 2022-12-31 RX ADMIN — ACETAMINOPHEN 1000 MG: 500 TABLET ORAL at 03:04

## 2022-12-31 RX ADMIN — IBUPROFEN 800 MG: 800 TABLET, FILM COATED ORAL at 17:58

## 2022-12-31 RX ADMIN — SIMETHICONE 125 MG: 125 TABLET, CHEWABLE ORAL at 14:17

## 2022-12-31 RX ADMIN — ACETAMINOPHEN 1000 MG: 500 TABLET ORAL at 09:06

## 2022-12-31 RX ADMIN — ACETAMINOPHEN 1000 MG: 500 TABLET ORAL at 21:45

## 2022-12-31 RX ADMIN — ACETAMINOPHEN 1000 MG: 500 TABLET ORAL at 15:16

## 2022-12-31 RX ADMIN — PRENATAL WITH FERROUS FUM AND FOLIC ACID 1 TABLET: 3080; 920; 120; 400; 22; 1.84; 3; 20; 10; 1; 12; 200; 27; 25; 2 TABLET ORAL at 09:06

## 2022-12-31 RX ADMIN — DOCUSATE SODIUM 100 MG: 100 CAPSULE, LIQUID FILLED ORAL at 14:17

## 2022-12-31 ASSESSMENT — PAIN DESCRIPTION - PAIN TYPE
TYPE: ACUTE PAIN;SURGICAL PAIN
TYPE: SURGICAL PAIN
TYPE: ACUTE PAIN;SURGICAL PAIN
TYPE: SURGICAL PAIN
TYPE: ACUTE PAIN;SURGICAL PAIN
TYPE: SURGICAL PAIN

## 2022-12-31 ASSESSMENT — PATIENT HEALTH QUESTIONNAIRE - PHQ9
SUM OF ALL RESPONSES TO PHQ9 QUESTIONS 1 AND 2: 0
1. LITTLE INTEREST OR PLEASURE IN DOING THINGS: NOT AT ALL

## 2022-12-31 NOTE — PROGRESS NOTES
1740 Assumed care from labor and delivery. Oriented patient to room, call light, emergency light, TV, bed remote. Assessment completed, fundus firm, lochia light. ABD incision with mepilex silver dressing intact. Plan of care reviewed, verbalized understanding. Patient denies pain at this time, will call if pain med intervention needed.

## 2022-12-31 NOTE — PROGRESS NOTES
Bedside report received from dayshift RN. 12hr chart check complete, MAR and orders reviewed. Dr. Farley called for clarification of toradol order. Telephone order received to discontinue toradol at this time.

## 2022-12-31 NOTE — PROGRESS NOTES
8289- Bedside report received from NEFTALI Louie.  Patient denied needs.  6568- Reviewed plan of care with patient.  FOB at bedside.  1250- Patient up to the bathroom and voided.  1307- Patient assessment done.  Patient reported she is voiding without difficulty and passing flatus.  Patient denied dizziness and reported that she is walking without difficulty.  Patient instructed to ambulate in hallways four times a day.  Discussed pain management plan, to include MD orders for scheduled pain medication as well as the use of heat packs and ice packs as additional comfort measures.  Reviewed plan of care.  Patient verbalized understanding.

## 2022-12-31 NOTE — PROGRESS NOTES
1015-pt here for her scheduled primary  section for twins.  She has no obstetrical complaints.  1450-In OR.  1521 and 1523 delivery of A and B, , , girl and boy respectively.  1608-In Pacu.  1718-Pt transferred to PP room after uncomplicated surgery and recovery.

## 2022-12-31 NOTE — LACTATION NOTE
This note was copied from a baby's chart.  Multip Mom said she BFwith her first 2 kids for about 6 months each. She had enough milk, but had to go back to work. She had BF in process with Baby Boy twin when I came in. He is rooting and willing to BF. Baby latch L-7. Helped to adjust Mom's positioning. Tried to encourage cross cradle hold, but she is more comfortable in cradle hold on the left. Encouraged Mom to keep baby's nose tip against breast. He does have some frenulum attachment.  RN and NBN notified.  Provided LPI handouts and discussed specific needs for LPI baby. Mom has been pumping for  few minutes prior to BF to help with let down. She is getting a few mL. Taught she could also use HE and spoons to aid with let down. Taught supply and demand, frequent empyting of both breasts to initiate her supply. It will take about 72 hours to see her milk supply increase. Taught to feed paced bottles to help preserve baby at breast feeding. Taught to feed babies ad toshia per feeding cues, minimum of 10 times daily. Mom is keeping a chart to help her track each twins feedings. She will continue to supplement them with ABM per feeding volume GL provided. Provided written educational materials and resources. FOB is bedside and helpful. Encouraged to call for any additional LC assistance if needed.

## 2022-12-31 NOTE — PROGRESS NOTES
Pt assisted to dangle, stand, and ambulate to restroom. Tolerated well without dizziness, lightheadedness, or increased pain. Urine output adequate, so ochoa catheter discontinued as per MD order. Diane care/pad change performed and pt assisted back to bed into position of comfort. SCD's in place bilaterally and continuous pulse oximeter in use for dura checks, call light within reach.

## 2022-12-31 NOTE — OP REPORT
DATE OF SERVICE:  2022     PREOPERATIVE DIAGNOSES:  1.  Intrauterine pregnancy at 37 and 1/7 weeks.  2.  Diamniotic dichorionic twin gestation.  3.  Malpresentation.  4.  Desires permanent sterility.  5.  Polyhydramnios for twin A.  6.  Obesity.     POSTOPERATIVE DIAGNOSES:  1.  Intrauterine pregnancy at 37 and 1/7 weeks.  2.  Diamniotic dichorionic twin gestation.  3.  Malpresentation.  4.  Desires permanent sterility.  5.  Polyhydramnios for twin A.  6.  Obesity.  7.  Nuchal cord x1 for twin A and nuchal cord x2 for twin B.     PROCEDURE:  Primary lower uterine segment transverse  section and   bilateral salpingectomy.     SURGEON:  Kelsi Fuentes MD     ASSISTANT:  Carolee Vargas MD     ANESTHESIA:  Spinal.     ANESTHESIOLOGISTS:  Quincy Linn MD and Isatu Noland MD     ESTIMATED BLOOD LOSS:  600 mL     FLUIDS:  1500 mL crystalloid.     URINE OUTPUT:  200 mL clear urine.     FINDINGS:  Live born female infant, twin A vertex presentation, nuchal cord   x1, Apgars 8 and 9, weight 6 pounds 15 ounces.  Copious clear amniotic fluid.    Twin B male infant, breech presentation, nuchal cord x2, Apgars 8 and 9, 5   pounds 12 ounces.  Normal-appearing uterus, tubes and ovaries.    Normal-appearing placentas with 3-vessel cords.     SPECIMENS:  Segments of right and left fallopian tubes to pathology.     DRAINS:  Day to gravity.     COMPLICATIONS:  None apparent.     DISPOSITION:  The patient to recovery room in stable condition.     TECHNIQUE:  After adequate informed consent was obtained, the patient was   taken to the operating room.  She was placed under spinal anesthetic and laid   in the supine position with a left lateral tilt.  Fetal heart tones were   obtained and found to be in the 130s for twin A and in the 140s for twin B.    She had a Day catheter placed to gravity and sequential stockings placed on   her lower extremities.  She was then prepped and draped in the usual sterile    fashion.  She received 2 grams of Ancef preoperatively.  Timeout was taken to   confirm the proper patient and procedure.  After ensuring adequate anesthesia,   a Pfannenstiel skin incision was made and carried down sharply to the fascia.    The fascia was nicked in the midline and dissected bilaterally using sharp   dissection.  The fascia was then  from the rectus muscle both   superiorly and inferiorly using sharp dissection.  The peritoneum was   identified and entered bluntly after ensuring no evidence of bowel or bladder   underneath.  The peritoneum was then dissected superiorly and inferiorly using   sharp dissection.  An Hernandez O retractor was placed with care taken to ensure   no bowel impingement occurred.  Using Metzenbaum scissors, a bladder flap was   created.  Using a scalpel, a transverse incision was made across the lower   uterine segment to the level of the amniotic sac.  The uterine incision was   extended bilaterally using blunt dissection.  The amniotic sac was encountered   and ruptured and copious clear fluid was noted.  The surgeon's hand was   placed in the uterine cavity and the fetal vertex was elevated and delivered   without complication.  A loose nuchal cord x1 was easily reduced.  The rest of   the baby was delivered easily.  This is a viable female infant, weight 6   pounds 15 ounces, Apgars 8 and 9 and was delivered without complication.    There was delayed cord clamping for 30 seconds.  The cord was then clamped   twice and cut and the infant handed off to the waiting respiratory therapist   and nursing staff.  Attention was then turned to twin B, which was known to be   in the breech presentation.  The amniotic sac was encountered and ruptured   and clear fluid was noted.  The fetal feet were grasped and delivered through   the uterine incision without complications.  The shoulders were delivered with   the Pinard maneuver.  The after-coming head delivered without  complication.    A nuchal cord x2 was identified on this baby. This is a viable male fetus,   weight 5 pounds 12 ounces, Apgars 8 and 9.  There was delayed cord clamping   for 30 seconds.  The cord was then clamped twice and cut and the infant handed   off to the waiting respiratory therapist and nursing staff, crying   vigorously.  The placenta was removed with manual extraction.  The patient   then received 20 units of IV Pitocin and 1000 mL of lactated Ringer's IV.  The   uterine cavity was curetted using a dry lap sponge and there was no evidence   of any retained products of conception.  The placentas were not sent to   pathology and the patient had agreed to a placental donation instead.  The   uterine incision was identified and closed using a single suture of 0 Vicryl   in a running locked fashion.  Two figure-of-eight sutures were placed on the   right apex to achieve good uterine contraction.  The left fallopian tube was identified and followed to the fimbriated end. Using the Ligasure vessel sealant device, it was removed in its entirety. He same procedure was accomplished for the right fallopian tube.  Excellent  hemostasis was noted. The wound was irrigated   copiously.  Some scant bleeding was noted and Surgicel powder was placed on   this incision.  The Hernandez O retractor was removed.  The peritoneum was   identified.  Seprafilm was placed across the anterior surface of the lower   uterine segment and the anterior surface of the uterus.  The peritoneum was   then closed using a single suture of 2-0 Vicryl in a running fashion.  The   rectus muscles were reapproximated using a figure-of-eight suture.  The wound   was irrigated copiously.  No bleeding was noted.  The fascia was   reapproximated using two separate sutures of 0 Vicryl in a running fashion   overlapping in the midline.  The subcutaneous tissue was irrigated.  No   bleeding was noted.  This was reapproximated using 3-0 Vicryl in running    fashion.  The skin was reapproximated using 4-0 Vicryl in a running fashion.    Steri-Strips were placed and dressing was applied.  Sponge and instrument   counts were correct x3.  The patient tolerated the procedure well and there   were no apparent complications and she was taken to the recovery room in   stable condition.        ______________________________  MD ARNEL TERESA/YOUSIF    DD:  12/30/2022 16:18  DT:  12/30/2022 18:49    Job#:  [id number]

## 2022-12-31 NOTE — ANESTHESIA TIME REPORT
Anesthesia Start and Stop Event Times     Date Time Event    12/30/2022 1107 Ready for Procedure     1450 Anesthesia Start     1611 Anesthesia Stop        Responsible Staff  12/30/22    Name Role Begin End    Quincy Linn M.D. Anesth 1450 1514    Isatu Noland M.D. Anesth 1514 1611        Overtime Reason:  no overtime (within assigned shift)    Comments:

## 2022-12-31 NOTE — PROGRESS NOTES
Progress Note    Katty Leslie   Post-op day 1  Chief Admitting Dx: Labor and delivery indication for care or intervention [O75.9]  Indication for care in labor or delivery [O75.9]  Delivery Type:  for   1.  Intrauterine pregnancy at 37 and 1/7 weeks.  2.  Diamniotic dichorionic twin gestation.  3.  Malpresentation.  4.  Desires permanent sterility.  5.  Polyhydramnios for twin A.  6.  Obesity.  7.  Nuchal cord x1 for twin A and nuchal cord x2 for twin B.    Subjective:  Pt with adequate pain control. Pt is ambulating and tolerating a regular diet.     Vitals:    22 0300 22 0408 22 0500 22 0604   BP:    120/79   Pulse: 85 80 77 82   Resp: 19 17 18 16   Temp:    36.6 °C (97.9 °F)   TempSrc:    Temporal   SpO2: 97% 97% 97% 94%   Weight:       Height:           Exam:  Gen: NAD  Abdomen: Abdomen soft, non-tender. BS normal. No masses,  No organomegaly  Incision: dry and intact/mepilex dressing in place  Fundus Tenderness: no  Below umbilicus: yes  Perineum: perineum intact  Extremities: trace  Lochia: mild    Labs:   Recent Results (from the past 24 hour(s))   Hold Blood Bank Specimen (Not Tested)    Collection Time: 22 11:00 AM   Result Value Ref Range    Holding Tube - Bb DONE    CBC with Differential    Collection Time: 22 11:00 AM   Result Value Ref Range    WBC 6.5 4.8 - 10.8 K/uL    RBC 4.34 4.20 - 5.40 M/uL    Hemoglobin 13.3 12.0 - 16.0 g/dL    Hematocrit 39.1 37.0 - 47.0 %    MCV 90.1 81.4 - 97.8 fL    MCH 30.6 27.0 - 33.0 pg    MCHC 34.0 33.6 - 35.0 g/dL    RDW 43.5 35.9 - 50.0 fL    Platelet Count 166 164 - 446 K/uL    MPV 12.5 9.0 - 12.9 fL    Neutrophils-Polys 64.90 44.00 - 72.00 %    Lymphocytes 24.70 22.00 - 41.00 %    Monocytes 8.40 0.00 - 13.40 %    Eosinophils 0.60 0.00 - 6.90 %    Basophils 0.30 0.00 - 1.80 %    Immature Granulocytes 1.10 (H) 0.00 - 0.90 %    Nucleated RBC 0.00 /100 WBC    Neutrophils (Absolute) 4.24 2.00 - 7.15 K/uL    Lymphs  (Absolute) 1.61 1.00 - 4.80 K/uL    Monos (Absolute) 0.55 0.00 - 0.85 K/uL    Eos (Absolute) 0.04 0.00 - 0.51 K/uL    Baso (Absolute) 0.02 0.00 - 0.12 K/uL    Immature Granulocytes (abs) 0.07 0.00 - 0.11 K/uL    NRBC (Absolute) 0.00 K/uL   T.PALLIDUM AB GLORIA (Syphilis)    Collection Time: 12/30/22 11:00 AM   Result Value Ref Range    Syphilis, Treponemal Qual Non-Reactive Non-Reactive   CBC without differential- Once in AM regardless of delivery time    Collection Time: 12/31/22  7:38 AM   Result Value Ref Range    WBC 10.4 4.8 - 10.8 K/uL    RBC 3.78 (L) 4.20 - 5.40 M/uL    Hemoglobin 11.6 (L) 12.0 - 16.0 g/dL    Hematocrit 33.9 (L) 37.0 - 47.0 %    MCV 89.7 81.4 - 97.8 fL    MCH 30.7 27.0 - 33.0 pg    MCHC 34.2 33.6 - 35.0 g/dL    RDW 44.2 35.9 - 50.0 fL    Platelet Count 160 (L) 164 - 446 K/uL    MPV 12.4 9.0 - 12.9 fL       Assessment:  Continue Routine postpartum care    Plan:  Advance Diet, Encourange Ambulation, and Consider Discharge 24h-48 hours    Sury Farley M.D.

## 2022-12-31 NOTE — ANESTHESIA POSTPROCEDURE EVALUATION
Patient: Katty Leslie    Procedure Summary     Date: 22 Room / Location: [facility] OR 01 / SURGERY LABOR AND DELIVERY    Anesthesia Start: 1450 Anesthesia Stop: 161    Procedure: PRIMARY  SECTION, BILATERAL SALPINGECTOMY Diagnosis:        delivery delivered      (same, del)    Surgeons: Kelsi Fuentes M.D. Responsible Provider: Isatu Noland M.D.    Anesthesia Type: spinal ASA Status: 2          Final Anesthesia Type: spinal  Last vitals  BP   105/52   Temp   97.4   Pulse   61   Resp   18   SpO2     96%     Anesthesia Post Evaluation    Patient location during evaluation: PACU  Patient participation: complete - patient participated  Level of consciousness: awake  Pain score: 0    Airway patency: patent  Anesthetic complications: no  Cardiovascular status: hemodynamically stable  Respiratory status: acceptable  Hydration status: acceptable    PONV: none          No notable events documented.

## 2023-01-01 VITALS
WEIGHT: 220 LBS | RESPIRATION RATE: 20 BRPM | TEMPERATURE: 97.3 F | BODY MASS INDEX: 40.48 KG/M2 | OXYGEN SATURATION: 99 % | SYSTOLIC BLOOD PRESSURE: 122 MMHG | HEART RATE: 76 BPM | DIASTOLIC BLOOD PRESSURE: 78 MMHG | HEIGHT: 62 IN

## 2023-01-01 PROCEDURE — 700102 HCHG RX REV CODE 250 W/ 637 OVERRIDE(OP): Performed by: OBSTETRICS & GYNECOLOGY

## 2023-01-01 PROCEDURE — 700111 HCHG RX REV CODE 636 W/ 250 OVERRIDE (IP): Performed by: OBSTETRICS & GYNECOLOGY

## 2023-01-01 PROCEDURE — A9270 NON-COVERED ITEM OR SERVICE: HCPCS | Performed by: OBSTETRICS & GYNECOLOGY

## 2023-01-01 PROCEDURE — 90686 IIV4 VACC NO PRSV 0.5 ML IM: CPT | Performed by: OBSTETRICS & GYNECOLOGY

## 2023-01-01 PROCEDURE — 3E02340 INTRODUCTION OF INFLUENZA VACCINE INTO MUSCLE, PERCUTANEOUS APPROACH: ICD-10-PCS | Performed by: OBSTETRICS & GYNECOLOGY

## 2023-01-01 PROCEDURE — 90471 IMMUNIZATION ADMIN: CPT

## 2023-01-01 RX ORDER — IBUPROFEN 800 MG/1
800 TABLET ORAL EVERY 8 HOURS
Qty: 60 TABLET | Refills: 1 | Status: SHIPPED | OUTPATIENT
Start: 2023-01-01

## 2023-01-01 RX ADMIN — ACETAMINOPHEN 1000 MG: 500 TABLET ORAL at 09:47

## 2023-01-01 RX ADMIN — INFLUENZA A VIRUS A/VICTORIA/2570/2019 IVR-215 (H1N1) ANTIGEN (FORMALDEHYDE INACTIVATED), INFLUENZA A VIRUS A/DARWIN/9/2021 SAN-010 (H3N2) ANTIGEN (FORMALDEHYDE INACTIVATED), INFLUENZA B VIRUS B/PHUKET/3073/2013 ANTIGEN (FORMALDEHYDE INACTIVATED), AND INFLUENZA B VIRUS B/MICHIGAN/01/2021 ANTIGEN (FORMALDEHYDE INACTIVATED) 0.5 ML: 15; 15; 15; 15 INJECTION, SUSPENSION INTRAMUSCULAR at 09:47

## 2023-01-01 RX ADMIN — IBUPROFEN 800 MG: 800 TABLET, FILM COATED ORAL at 02:18

## 2023-01-01 RX ADMIN — PRENATAL WITH FERROUS FUM AND FOLIC ACID 1 TABLET: 3080; 920; 120; 400; 22; 1.84; 3; 20; 10; 1; 12; 200; 27; 25; 2 TABLET ORAL at 09:46

## 2023-01-01 RX ADMIN — ACETAMINOPHEN 1000 MG: 500 TABLET ORAL at 02:18

## 2023-01-01 ASSESSMENT — PAIN DESCRIPTION - PAIN TYPE
TYPE: SURGICAL PAIN

## 2023-01-01 ASSESSMENT — EDINBURGH POSTNATAL DEPRESSION SCALE (EPDS)
I HAVE BEEN ANXIOUS OR WORRIED FOR NO GOOD REASON: NO, NOT AT ALL
I HAVE BEEN SO UNHAPPY THAT I HAVE BEEN CRYING: NO, NEVER
I HAVE BEEN SO UNHAPPY THAT I HAVE HAD DIFFICULTY SLEEPING: NOT AT ALL
THE THOUGHT OF HARMING MYSELF HAS OCCURRED TO ME: NEVER
I HAVE LOOKED FORWARD WITH ENJOYMENT TO THINGS: AS MUCH AS I EVER DID
I HAVE BEEN ABLE TO LAUGH AND SEE THE FUNNY SIDE OF THINGS: AS MUCH AS I ALWAYS COULD
THINGS HAVE BEEN GETTING ON TOP OF ME: NO, MOST OF THE TIME I HAVE COPED QUITE WELL
I HAVE FELT SCARED OR PANICKY FOR NO GOOD REASON: NO, NOT AT ALL
I HAVE FELT SAD OR MISERABLE: NO, NOT AT ALL
I HAVE BLAMED MYSELF UNNECESSARILY WHEN THINGS WENT WRONG: NOT VERY OFTEN

## 2023-01-01 NOTE — DISCHARGE INSTRUCTIONS

## 2023-01-01 NOTE — LACTATION NOTE
This note was copied from a baby's chart.  Met with mother for a follow up lactation consultation prior to discharge. MOB reports that babies are breast feeding on demand, at least 8 times every 24hrs. She reports that feedings are comfortable. She reports that babies are breastfeeding 10-15min each time. She has been supplementing them with formula per supplemental guidelines handout which was provided. She is currently using her hand pump. Encouraged her to use an electric pump following feedings, especially while supplementing. She does have a breast pump at home.   Breast feeding assistance and education provided: Education provided regarding the milk making process and supply in demand. Frequent skin to skin encouraged. Encouraged MOB to offer the breast to baby any time she/he is showing hunger cues (cues reviewed). Encouraged MOB not to limit baby's time at the breast. Education provided regarding the importance of achieving a deep latch with each feeding to ensure proper stimulation, milk transfer, and reduce the chance for nipple damage/pain. Watch for stools to become yellow/green by day 5.   Plan: continue to breast feed babies on cue, at least 8 times every 24hrs. Pump with electric pump following latch and supplement following supplemental guidelines handout. Milk storage guidelines handout provided. [facility] outpatient lactation consultant list provided. Will fax [facility] referral. Encouraged her to reach out to [facility] as soon as possible to obtain an appointment for follow up lactation services.

## 2023-01-01 NOTE — DISCHARGE SUMMARY
Discharge Summary:      Katty Lesile    Admit Date:   2022  Discharge Date:  2023     Admitting diagnosis:  Labor and delivery indication for care or intervention [O75.9]  Indication for care in labor or delivery [O75.9]  Discharge Diagnosis: Status post  for   1.  Intrauterine pregnancy at 37 and 1/7 weeks.  2.  Diamniotic dichorionic twin gestation.  3.  Malpresentation.  4.  Desires permanent sterility.  5.  Polyhydramnios for twin A.  6.  Obesity.  7.  Nuchal cord x1 for twin A and nuchal cord x2 for twin B.     Pregnancy Complications: twin gestation  Tubal Ligation:  yes        History:  History reviewed. No pertinent past medical history.  OB History    Para Term  AB Living   2 2 1     3   SAB IAB Ectopic Molar Multiple Live Births           1 3      # Outcome Date GA Lbr Isra/2nd Weight Sex Delivery Anes PTL Lv   2A Para 22   3.17 kg (6 lb 15.8 oz) F CS-LTranv Spinal N DONNA   2B Para 22   2.63 kg (5 lb 12.8 oz) M CS-LTranv Spinal N DONNA   1 Term 12 40w1d  3.26 kg (7 lb 3 oz) F Vag-Spont EPI  DONNA      Birth Comments: Pt. states no complications.        Patient has no known allergies.  Patient Active Problem List    Diagnosis Date Noted    Labor and delivery indication for care or intervention 2022    Indication for care in labor or delivery 2022    Irregular menses 2021    Breakthrough bleeding on Nexplanon 2019    Uses condoms 2017    Weight gain 2017    Obesity (BMI 30-39.9) 2017    Nexplanon insertion 2016        Hospital Course:   27 y.o. , now para 3, was admitted with the above mentioned diagnosis, underwent primary  for twins. Patient postpartum course was unremarkable, with progressive advancement in diet , ambulation and toleration of oral analgesia. Patient without complaints today and desires discharge.      Vitals:    22 0934 22 1536 22 1800 23 0600    BP: 127/84 115/81 93/69 122/78   Pulse: 76 77 71 76   Resp: 16 16 18 20   Temp: 37 °C (98.6 °F) 36.6 °C (97.8 °F) 36.6 °C (97.8 °F) 36.3 °C (97.3 °F)   TempSrc: Temporal Temporal Temporal Temporal   SpO2: 96% 98% 97% 99%   Weight:       Height:           Current Facility-Administered Medications   Medication Dose    influenza vaccine quad (FLUZONE/FLUARIX) injection 0.5 mL  0.5 mL    lactated ringers infusion      ibuprofen (MOTRIN) tablet 800 mg  800 mg    Followed by    [START ON 1/3/2023] ibuprofen (MOTRIN) tablet 800 mg  800 mg    acetaminophen (TYLENOL) tablet 1,000 mg  1,000 mg    Followed by    [START ON 1/3/2023] acetaminophen (TYLENOL) tablet 1,000 mg  1,000 mg    oxyCODONE immediate-release (ROXICODONE) tablet 5 mg  5 mg    oxyCODONE immediate release (ROXICODONE) tablet 10 mg  10 mg    ondansetron (ZOFRAN) syringe/vial injection 4 mg  4 mg    Or    ondansetron (ZOFRAN ODT) dispertab 4 mg  4 mg    diphenhydrAMINE (BENADRYL) tablet/capsule 25 mg  25 mg    Or    diphenhydrAMINE (BENADRYL) injection 25 mg  25 mg    docusate sodium (COLACE) capsule 100 mg  100 mg    tetanus-dipth-acell pertussis (Tdap) inj 0.5 mL  0.5 mL    bisacodyl (DULCOLAX) suppository 10 mg  10 mg    magnesium hydroxide (MILK OF MAGNESIA) suspension 30 mL  30 mL    prenatal plus vitamin (STUARTNATAL 1+1) 27-1 MG tablet 1 Tablet  1 Tablet    simethicone (Mylicon) chewable tablet 125 mg  125 mg    calcium carbonate (Tums) chewable tab 1,000 mg  1,000 mg    oxytocin (Pitocin) infusion (for postpartum)  125 mL/hr    oxytocin (PITOCIN) injection 10 Units  10 Units       Exam:  Gen: NAD  Breast Exam: negative  Abdomen: Abdomen soft, non-tender. BS normal. No masses,  No organomegaly  Fundus Non Tender: no  Incision: dry and intact/mepilex in place  Perineum: perineum intact  Extremity: extremities, peripheral pulses and reflexes normal     Labs:  Recent Labs     12/30/22  1100 12/31/22  0738   WBC 6.5 10.4   RBC 4.34 3.78*   HEMOGLOBIN 13.3  11.6*   HEMATOCRIT 39.1 33.9*   MCV 90.1 89.7   MCH 30.6 30.7   MCHC 34.0 34.2   RDW 43.5 44.2   PLATELETCT 166 160*   MPV 12.5 12.4        Activity:   Discharge to home  Pelvic Rest x 6 weeks    Assessment:  normal postpartum course  Discharge Assessment: No areas of skin breakdown/redness; surgical incision intact/healing     Follow up:  2 and 6  week for incision check with Dr Fuentes.       Discharge Meds:   Current Outpatient Medications   Medication Sig Dispense Refill    ibuprofen (MOTRIN) 800 MG Tab Take 1 Tablet by mouth every 8 hours. Indications: Joint Damage causing Pain and Loss of Function 60 Tablet 1       Sury Farley M.D.

## 2023-01-01 NOTE — PROGRESS NOTES
Pt is A&Ox4. FOB is at bedside. VSS. Denies pain.  Up self.  Fundus is firm, lochia is light with no clots.  Dressings to abdomen is CDI.  MOB and FOB participating in infant care.  Call light within reach and working properly.

## 2023-01-01 NOTE — PROGRESS NOTES
0830 Assumed care. Assessment completed. Fundus firm, lochia light. Abdominal incision with dressing intact. Plan of Care reviewed. Denies pain at this time. Will call if pain intervention needed.      1430 Discharge instructions reviewed. Verbalized understanding. Papers signed. Prescribed meds and information given and reviewed.    1501 Patient left facility escorted by staff.

## 2023-01-02 LAB — PATHOLOGY CONSULT NOTE: NORMAL

## 2023-09-29 NOTE — CARE PLAN
Problem: Altered Physiologic Condition  Goal: Patient physiologically stable as evidenced by normal lochia, palpable uterine involution and vitals within normal limits  Outcome: Progressing     Problem: Infection - Postpartum  Goal: Postpartum patient will be free of signs and symptoms of infection  Outcome: Progressing     Problem: Early Mobilization - Post Surgery  Goal: Early mobilization post surgery  Outcome: Progressing     The patient is Stable - Low risk of patient condition declining or worsening    Shift Goals  Clinical Goals: Lochia wdl, pain control    Progress made toward(s) clinical / shift goals:  Pain well controlled with non-narcotic analgesia, pt was able to ambulate well with assistance and is caring for self and infants without difficulty. Lochia remains light without clots expressed, performing susanna care and pad changes as needed. No s/s infection noted on assessment, remains afebrile.     Patient is not progressing towards the following goals: NA      
The patient is Stable - Low risk of patient condition declining or worsening    Shift Goals  Clinical Goals: Maintain fundus firm, lochia light; pain management; pt to ambulate in hallways    Progress made toward(s) clinical / shift goals:  Fundus firm, lochia scant; pt reported pain relief with use of scheduled tylenol.    
The patient is Stable - Low risk of patient condition declining or worsening    Shift Goals  Clinical Goals: Pain control, ambulate hallway  Patient Goals: Pain control and bond with infants  Family Goals: Bond with infants    Progress made toward(s) clinical / shift goals:    Problem: Pain - Standard  Goal: Alleviation of pain or a reduction in pain to the patient’s comfort goal  Outcome: Progressing  Note: Pain assessed Q2h. Pain medication given per orders, see MAR.      
3

## 2025-05-14 ENCOUNTER — HOSPITAL ENCOUNTER (OUTPATIENT)
Dept: LAB | Facility: MEDICAL CENTER | Age: 30
End: 2025-05-14
Attending: OBSTETRICS & GYNECOLOGY
Payer: COMMERCIAL

## 2025-05-14 PROCEDURE — 86696 HERPES SIMPLEX TYPE 2 TEST: CPT

## 2025-05-14 PROCEDURE — 86695 HERPES SIMPLEX TYPE 1 TEST: CPT

## 2025-05-14 PROCEDURE — 86694 HERPES SIMPLEX NES ANTBDY: CPT

## 2025-05-14 PROCEDURE — 36415 COLL VENOUS BLD VENIPUNCTURE: CPT

## 2025-05-16 LAB — HSV1+2 IGG SER IA-ACNC: >22.4 IV

## 2025-05-17 LAB
HSV1 GG IGG SER-ACNC: 38.6 IV
HSV2 GG IGG SER-ACNC: 7.97 IV

## (undated) DEVICE — TRAY SPINAL ANESTHESIA NON-SAFETY (10/CA)

## (undated) DEVICE — KIT  I.V. START (100EA/CA)

## (undated) DEVICE — CATHETER IV NON-SAFETY 18 GA X 1 1/4 (50/BX 4BX/CA)

## (undated) DEVICE — SUTURE 4-0 VICRYL PLUSFS-1 - 27 INCH (36/BX)

## (undated) DEVICE — SUTURE 3-0 VICRYL PLUS CT-1 - 36 INCH (36/BX)

## (undated) DEVICE — SUTURE 0 VICRYL PLUS CT-1 - 36 INCH (36/BX)

## (undated) DEVICE — WATER IRRIGATION STERILE 1000ML (12EA/CA)

## (undated) DEVICE — DRESSING POST OP BORDER 4 X 10 (5EA/BX)

## (undated) DEVICE — PACK C-SECTION (2EA/CA)

## (undated) DEVICE — SUTURE 0 GUT-PLAIN (36PK/BX)

## (undated) DEVICE — TUBING CLEARLINK DUO-VENT - C-FLO (48EA/CA)

## (undated) DEVICE — SWABSTICK BENZOIN SINGLE (50EA/BX)

## (undated) DEVICE — HEMOSTAT SURG ABSORBABLE - 2 X 3 IN SURGICEL (24EA/CA)

## (undated) DEVICE — GLOVE BIOGEL SZ 6.5 SURGICAL PF LTX (50PR/BX 4BX/CA)

## (undated) DEVICE — SODIUM CHL IRRIGATION 0.9% 1000ML (12EA/CA)

## (undated) DEVICE — CLOSURE SKIN STRIP 1/2 X 4 IN - (STERI STRIP) (50/BX 4BX/CA)

## (undated) DEVICE — SET EXTENSION WITH 2 PORTS (48EA/CA) ***PART #2C8610 IS A SUBSTITUTE*****

## (undated) DEVICE — SLEEVE, SEQUENTIAL CALF REG

## (undated) DEVICE — ELECTRODE DUAL RETURN W/ CORD - (50/PK)

## (undated) DEVICE — HEAD HOLDER JUNIOR/ADULT

## (undated) DEVICE — SUTURE 2-0 VICRYL PLUS CT-1 36 (36PK/BX)"